# Patient Record
Sex: FEMALE | NOT HISPANIC OR LATINO
[De-identification: names, ages, dates, MRNs, and addresses within clinical notes are randomized per-mention and may not be internally consistent; named-entity substitution may affect disease eponyms.]

---

## 2017-04-06 ENCOUNTER — APPOINTMENT (OUTPATIENT)
Dept: INTERNAL MEDICINE | Facility: CLINIC | Age: 47
End: 2017-04-06

## 2017-04-06 VITALS
HEART RATE: 100 BPM | SYSTOLIC BLOOD PRESSURE: 120 MMHG | TEMPERATURE: 97.6 F | WEIGHT: 210 LBS | DIASTOLIC BLOOD PRESSURE: 83 MMHG | RESPIRATION RATE: 16 BRPM | HEIGHT: 70 IN | BODY MASS INDEX: 30.06 KG/M2

## 2017-04-06 DIAGNOSIS — L71.9 ROSACEA, UNSPECIFIED: ICD-10-CM

## 2017-04-06 LAB
ALBUMIN SERPL ELPH-MCNC: 4 G/DL
ALP BLD-CCNC: 65 U/L
ALT SERPL-CCNC: 10 U/L
ANION GAP SERPL CALC-SCNC: 13 MMOL/L
APPEARANCE: CLEAR
AST SERPL-CCNC: 11 U/L
BASOPHILS # BLD AUTO: 0.02 K/UL
BASOPHILS NFR BLD AUTO: 0.3 %
BILIRUB SERPL-MCNC: 0.6 MG/DL
BILIRUBIN URINE: NEGATIVE
BLOOD URINE: NEGATIVE
BUN SERPL-MCNC: 16 MG/DL
CALCIUM SERPL-MCNC: 8.8 MG/DL
CHLORIDE SERPL-SCNC: 105 MMOL/L
CHOLEST SERPL-MCNC: 189 MG/DL
CHOLEST/HDLC SERPL: 3.6 RATIO
CO2 SERPL-SCNC: 24 MMOL/L
COLOR: YELLOW
CREAT SERPL-MCNC: 0.64 MG/DL
EOSINOPHIL # BLD AUTO: 0.16 K/UL
EOSINOPHIL NFR BLD AUTO: 2.4 %
GLUCOSE QUALITATIVE U: NORMAL MG/DL
GLUCOSE SERPL-MCNC: 88 MG/DL
HCT VFR BLD CALC: 33.8 %
HDLC SERPL-MCNC: 52 MG/DL
HGB BLD-MCNC: 11.2 G/DL
IMM GRANULOCYTES NFR BLD AUTO: 0 %
KETONES URINE: NEGATIVE
LDLC SERPL CALC-MCNC: 116 MG/DL
LEUKOCYTE ESTERASE URINE: NEGATIVE
LYMPHOCYTES # BLD AUTO: 1.55 K/UL
LYMPHOCYTES NFR BLD AUTO: 23.1 %
MAN DIFF?: NORMAL
MCHC RBC-ENTMCNC: 29.1 PG
MCHC RBC-ENTMCNC: 33.1 GM/DL
MCV RBC AUTO: 87.8 FL
MONOCYTES # BLD AUTO: 0.35 K/UL
MONOCYTES NFR BLD AUTO: 5.2 %
NEUTROPHILS # BLD AUTO: 4.63 K/UL
NEUTROPHILS NFR BLD AUTO: 69 %
NITRITE URINE: NEGATIVE
PH URINE: 6.5
PLATELET # BLD AUTO: 407 K/UL
POTASSIUM SERPL-SCNC: 4 MMOL/L
PROT SERPL-MCNC: 7.2 G/DL
PROTEIN URINE: NEGATIVE MG/DL
RBC # BLD: 3.85 M/UL
RBC # FLD: 13.2 %
SODIUM SERPL-SCNC: 142 MMOL/L
SPECIFIC GRAVITY URINE: 1.01
TRIGL SERPL-MCNC: 103 MG/DL
UROBILINOGEN URINE: NORMAL MG/DL
WBC # FLD AUTO: 6.71 K/UL

## 2017-04-11 LAB
25(OH)D3 SERPL-MCNC: 20.7 NG/ML
HBA1C MFR BLD HPLC: 5.7 %
THYROGLOB AB SERPL-ACNC: <20 IU/ML
THYROPEROXIDASE AB SERPL IA-ACNC: <10 IU/ML
TSH SERPL-ACNC: 0.97 UIU/ML

## 2017-05-04 ENCOUNTER — APPOINTMENT (OUTPATIENT)
Dept: RHEUMATOLOGY | Facility: CLINIC | Age: 47
End: 2017-05-04

## 2017-05-04 VITALS
OXYGEN SATURATION: 98 % | HEIGHT: 70 IN | HEART RATE: 68 BPM | SYSTOLIC BLOOD PRESSURE: 110 MMHG | WEIGHT: 213 LBS | TEMPERATURE: 98 F | BODY MASS INDEX: 30.49 KG/M2 | RESPIRATION RATE: 16 BRPM | DIASTOLIC BLOOD PRESSURE: 80 MMHG

## 2017-05-04 DIAGNOSIS — D17.9 BENIGN LIPOMATOUS NEOPLASM, UNSPECIFIED: ICD-10-CM

## 2017-05-08 LAB
ANA SER IF-ACNC: NEGATIVE
CENTROMERE IGG SER-ACNC: <0.2 AL
CRP SERPL-MCNC: <0.2 MG/DL
DSDNA AB SER-ACNC: <12 IU/ML
ENA RNP AB SER IA-ACNC: <0.2 AL
ENA SCL70 IGG SER IA-ACNC: <0.2 AL
ENA SM AB SER IA-ACNC: <0.2 AL
ENA SS-A AB SER IA-ACNC: <0.2 AL
ENA SS-B AB SER IA-ACNC: <0.2 AL
ERYTHROCYTE [SEDIMENTATION RATE] IN BLOOD BY WESTERGREN METHOD: 13 MM/HR
HAV IGM SER QL: NONREACTIVE
HBV CORE IGM SER QL: NONREACTIVE
HBV SURFACE AG SER QL: NONREACTIVE
HCV AB SER QL: NONREACTIVE
HCV S/CO RATIO: 0.14 S/CO

## 2017-05-25 ENCOUNTER — APPOINTMENT (OUTPATIENT)
Dept: RHEUMATOLOGY | Facility: CLINIC | Age: 47
End: 2017-05-25

## 2017-05-25 VITALS
WEIGHT: 214 LBS | SYSTOLIC BLOOD PRESSURE: 120 MMHG | RESPIRATION RATE: 18 BRPM | TEMPERATURE: 98.2 F | HEIGHT: 68 IN | HEART RATE: 74 BPM | OXYGEN SATURATION: 98 % | DIASTOLIC BLOOD PRESSURE: 68 MMHG | BODY MASS INDEX: 32.43 KG/M2

## 2017-08-03 ENCOUNTER — MEDICATION RENEWAL (OUTPATIENT)
Age: 47
End: 2017-08-03

## 2017-09-21 ENCOUNTER — APPOINTMENT (OUTPATIENT)
Dept: RHEUMATOLOGY | Facility: CLINIC | Age: 47
End: 2017-09-21
Payer: COMMERCIAL

## 2017-09-21 VITALS
TEMPERATURE: 98.4 F | RESPIRATION RATE: 98 BRPM | HEART RATE: 78 BPM | OXYGEN SATURATION: 98 % | HEIGHT: 68 IN | WEIGHT: 210 LBS | BODY MASS INDEX: 31.83 KG/M2 | DIASTOLIC BLOOD PRESSURE: 76 MMHG | SYSTOLIC BLOOD PRESSURE: 112 MMHG

## 2017-09-21 PROCEDURE — 99214 OFFICE O/P EST MOD 30 MIN: CPT

## 2017-10-05 ENCOUNTER — APPOINTMENT (OUTPATIENT)
Dept: RHEUMATOLOGY | Facility: CLINIC | Age: 47
End: 2017-10-05
Payer: COMMERCIAL

## 2017-10-05 VITALS — DIASTOLIC BLOOD PRESSURE: 56 MMHG | BODY MASS INDEX: 32.23 KG/M2 | SYSTOLIC BLOOD PRESSURE: 98 MMHG | WEIGHT: 212 LBS

## 2017-10-05 PROCEDURE — 20610 DRAIN/INJ JOINT/BURSA W/O US: CPT | Mod: LT

## 2017-10-05 RX ORDER — HYLAN G-F 20 16MG/2ML
16 SYRINGE (ML) INTRAARTICULAR
Qty: 0 | Refills: 0 | Status: COMPLETED | OUTPATIENT
Start: 2017-10-05

## 2017-10-05 RX ADMIN — Medication 0 MG/2ML: at 00:00

## 2017-10-12 ENCOUNTER — APPOINTMENT (OUTPATIENT)
Dept: RHEUMATOLOGY | Facility: CLINIC | Age: 47
End: 2017-10-12
Payer: COMMERCIAL

## 2017-10-12 PROCEDURE — 20610 DRAIN/INJ JOINT/BURSA W/O US: CPT | Mod: LT

## 2017-10-12 RX ORDER — HYLAN G-F 20 16MG/2ML
16 SYRINGE (ML) INTRAARTICULAR
Qty: 0 | Refills: 0 | Status: COMPLETED | OUTPATIENT
Start: 2017-10-12

## 2017-10-12 RX ADMIN — Medication 0 MG/2ML: at 00:00

## 2017-10-17 VITALS
HEART RATE: 78 BPM | BODY MASS INDEX: 32.28 KG/M2 | SYSTOLIC BLOOD PRESSURE: 130 MMHG | HEIGHT: 68 IN | RESPIRATION RATE: 16 BRPM | OXYGEN SATURATION: 98 % | WEIGHT: 213 LBS | DIASTOLIC BLOOD PRESSURE: 70 MMHG | TEMPERATURE: 98.9 F

## 2017-10-19 ENCOUNTER — APPOINTMENT (OUTPATIENT)
Dept: RHEUMATOLOGY | Facility: CLINIC | Age: 47
End: 2017-10-19
Payer: COMMERCIAL

## 2017-10-19 VITALS — BODY MASS INDEX: 32.39 KG/M2 | WEIGHT: 213 LBS | DIASTOLIC BLOOD PRESSURE: 72 MMHG | SYSTOLIC BLOOD PRESSURE: 116 MMHG

## 2017-10-19 VITALS — OXYGEN SATURATION: 98 % | TEMPERATURE: 98.7 F | RESPIRATION RATE: 16 BRPM | HEART RATE: 73 BPM

## 2017-10-19 PROCEDURE — 20610 DRAIN/INJ JOINT/BURSA W/O US: CPT | Mod: LT

## 2017-10-19 RX ORDER — HYLAN G-F 20 16MG/2ML
16 SYRINGE (ML) INTRAARTICULAR
Qty: 0 | Refills: 0 | Status: COMPLETED | OUTPATIENT
Start: 2017-10-19

## 2017-10-19 RX ADMIN — Medication 0 MG/2ML: at 00:00

## 2017-10-24 ENCOUNTER — MOBILE ON CALL (OUTPATIENT)
Age: 47
End: 2017-10-24

## 2017-10-31 ENCOUNTER — APPOINTMENT (OUTPATIENT)
Dept: RHEUMATOLOGY | Facility: CLINIC | Age: 47
End: 2017-10-31

## 2017-11-14 ENCOUNTER — APPOINTMENT (OUTPATIENT)
Dept: RHEUMATOLOGY | Facility: CLINIC | Age: 47
End: 2017-11-14
Payer: COMMERCIAL

## 2017-11-14 VITALS
SYSTOLIC BLOOD PRESSURE: 120 MMHG | OXYGEN SATURATION: 98 % | RESPIRATION RATE: 16 BRPM | BODY MASS INDEX: 32.84 KG/M2 | TEMPERATURE: 98.3 F | WEIGHT: 216 LBS | HEART RATE: 86 BPM | DIASTOLIC BLOOD PRESSURE: 68 MMHG

## 2017-11-14 PROCEDURE — 20610 DRAIN/INJ JOINT/BURSA W/O US: CPT | Mod: LT

## 2017-11-14 PROCEDURE — 99213 OFFICE O/P EST LOW 20 MIN: CPT | Mod: 25

## 2017-11-14 RX ORDER — HYLAN G-F 20 16MG/2ML
16 SYRINGE (ML) INTRAARTICULAR
Qty: 0 | Refills: 0 | Status: COMPLETED | OUTPATIENT
Start: 2017-11-14

## 2017-11-14 RX ADMIN — Medication 0 MG/2ML: at 00:00

## 2017-11-21 ENCOUNTER — APPOINTMENT (OUTPATIENT)
Dept: RHEUMATOLOGY | Facility: CLINIC | Age: 47
End: 2017-11-21
Payer: COMMERCIAL

## 2017-11-21 VITALS — DIASTOLIC BLOOD PRESSURE: 68 MMHG | SYSTOLIC BLOOD PRESSURE: 106 MMHG

## 2017-11-21 PROCEDURE — 20610 DRAIN/INJ JOINT/BURSA W/O US: CPT

## 2017-11-21 RX ADMIN — Medication 0 MG/2ML: at 00:00

## 2017-11-30 ENCOUNTER — APPOINTMENT (OUTPATIENT)
Dept: RHEUMATOLOGY | Facility: CLINIC | Age: 47
End: 2017-11-30
Payer: COMMERCIAL

## 2017-11-30 VITALS
RESPIRATION RATE: 16 BRPM | TEMPERATURE: 98.4 F | WEIGHT: 214 LBS | SYSTOLIC BLOOD PRESSURE: 102 MMHG | HEART RATE: 89 BPM | DIASTOLIC BLOOD PRESSURE: 66 MMHG | HEIGHT: 68 IN | OXYGEN SATURATION: 98 % | BODY MASS INDEX: 32.43 KG/M2

## 2017-11-30 PROCEDURE — 20610 DRAIN/INJ JOINT/BURSA W/O US: CPT | Mod: RT

## 2017-11-30 RX ORDER — HYLAN G-F 20 16MG/2ML
16 SYRINGE (ML) INTRAARTICULAR
Qty: 0 | Refills: 0 | Status: COMPLETED | OUTPATIENT
Start: 2017-11-21

## 2017-11-30 RX ORDER — HYLAN G-F 20 16MG/2ML
16 SYRINGE (ML) INTRAARTICULAR
Qty: 0 | Refills: 0 | Status: COMPLETED | OUTPATIENT
Start: 2017-11-30

## 2017-11-30 RX ADMIN — Medication 0 MG/2ML: at 00:00

## 2018-01-25 ENCOUNTER — APPOINTMENT (OUTPATIENT)
Dept: RHEUMATOLOGY | Facility: CLINIC | Age: 48
End: 2018-01-25
Payer: COMMERCIAL

## 2018-01-25 VITALS
SYSTOLIC BLOOD PRESSURE: 116 MMHG | OXYGEN SATURATION: 98 % | RESPIRATION RATE: 16 BRPM | BODY MASS INDEX: 32.74 KG/M2 | TEMPERATURE: 98 F | HEIGHT: 68 IN | WEIGHT: 216 LBS | HEART RATE: 81 BPM | DIASTOLIC BLOOD PRESSURE: 70 MMHG

## 2018-01-25 DIAGNOSIS — M25.549 PAIN IN JOINTS OF UNSPECIFIED HAND: ICD-10-CM

## 2018-01-25 PROCEDURE — 99214 OFFICE O/P EST MOD 30 MIN: CPT

## 2018-01-28 PROBLEM — M25.549 HAND JOINT PAIN: Status: ACTIVE | Noted: 2017-05-04

## 2018-02-21 ENCOUNTER — RX RENEWAL (OUTPATIENT)
Age: 48
End: 2018-02-21

## 2018-03-26 ENCOUNTER — MOBILE ON CALL (OUTPATIENT)
Age: 48
End: 2018-03-26

## 2018-04-17 ENCOUNTER — APPOINTMENT (OUTPATIENT)
Dept: INTERNAL MEDICINE | Facility: CLINIC | Age: 48
End: 2018-04-17
Payer: COMMERCIAL

## 2018-04-17 VITALS
DIASTOLIC BLOOD PRESSURE: 80 MMHG | HEIGHT: 68 IN | RESPIRATION RATE: 16 BRPM | SYSTOLIC BLOOD PRESSURE: 124 MMHG | HEART RATE: 79 BPM | TEMPERATURE: 98 F | WEIGHT: 216 LBS | BODY MASS INDEX: 32.74 KG/M2 | OXYGEN SATURATION: 98 %

## 2018-04-17 PROCEDURE — 99213 OFFICE O/P EST LOW 20 MIN: CPT

## 2018-04-25 ENCOUNTER — RX RENEWAL (OUTPATIENT)
Age: 48
End: 2018-04-25

## 2018-05-03 ENCOUNTER — APPOINTMENT (OUTPATIENT)
Dept: RHEUMATOLOGY | Facility: CLINIC | Age: 48
End: 2018-05-03
Payer: COMMERCIAL

## 2018-05-03 ENCOUNTER — LABORATORY RESULT (OUTPATIENT)
Age: 48
End: 2018-05-03

## 2018-05-03 VITALS
HEART RATE: 79 BPM | BODY MASS INDEX: 32.74 KG/M2 | WEIGHT: 216 LBS | DIASTOLIC BLOOD PRESSURE: 80 MMHG | HEIGHT: 68 IN | TEMPERATURE: 98.4 F | SYSTOLIC BLOOD PRESSURE: 129 MMHG | RESPIRATION RATE: 16 BRPM | OXYGEN SATURATION: 96 %

## 2018-05-03 PROCEDURE — 99213 OFFICE O/P EST LOW 20 MIN: CPT

## 2018-05-07 LAB
25(OH)D3 SERPL-MCNC: 26.5 NG/ML
ALBUMIN SERPL ELPH-MCNC: 4.1 G/DL
ALP BLD-CCNC: 70 U/L
ALT SERPL-CCNC: 11 U/L
ANION GAP SERPL CALC-SCNC: 16 MMOL/L
APPEARANCE: CLEAR
AST SERPL-CCNC: 16 U/L
BASOPHILS # BLD AUTO: 0.02 K/UL
BASOPHILS NFR BLD AUTO: 0.3 %
BILIRUB SERPL-MCNC: 0.7 MG/DL
BILIRUBIN URINE: NEGATIVE
BLOOD URINE: NEGATIVE
BUN SERPL-MCNC: 15 MG/DL
CALCIUM SERPL-MCNC: 9.8 MG/DL
CHLORIDE SERPL-SCNC: 103 MMOL/L
CHOLEST SERPL-MCNC: 196 MG/DL
CHOLEST/HDLC SERPL: 3.4 RATIO
CO2 SERPL-SCNC: 20 MMOL/L
COLOR: ABNORMAL
CREAT SERPL-MCNC: 0.72 MG/DL
EOSINOPHIL # BLD AUTO: 0.09 K/UL
EOSINOPHIL NFR BLD AUTO: 1.2 %
GLUCOSE QUALITATIVE U: NEGATIVE MG/DL
GLUCOSE SERPL-MCNC: 59 MG/DL
HBA1C MFR BLD HPLC: 5.6 %
HCT VFR BLD CALC: 37.9 %
HDLC SERPL-MCNC: 57 MG/DL
HGB BLD-MCNC: 11.4 G/DL
IMM GRANULOCYTES NFR BLD AUTO: 0.3 %
KETONES URINE: ABNORMAL
LDLC SERPL CALC-MCNC: 111 MG/DL
LEUKOCYTE ESTERASE URINE: NEGATIVE
LYMPHOCYTES # BLD AUTO: 1.79 K/UL
LYMPHOCYTES NFR BLD AUTO: 24.7 %
MAN DIFF?: NORMAL
MCHC RBC-ENTMCNC: 27.8 PG
MCHC RBC-ENTMCNC: 30.1 GM/DL
MCV RBC AUTO: 92.4 FL
MONOCYTES # BLD AUTO: 0.46 K/UL
MONOCYTES NFR BLD AUTO: 6.4 %
NEUTROPHILS # BLD AUTO: 4.86 K/UL
NEUTROPHILS NFR BLD AUTO: 67.1 %
NITRITE URINE: NEGATIVE
PH URINE: 5.5
PLATELET # BLD AUTO: 429 K/UL
POTASSIUM SERPL-SCNC: 4.1 MMOL/L
PROT SERPL-MCNC: 7.5 G/DL
PROTEIN URINE: ABNORMAL MG/DL
RBC # BLD: 4.1 M/UL
RBC # FLD: 15.7 %
SODIUM SERPL-SCNC: 139 MMOL/L
SPECIFIC GRAVITY URINE: 1.03
TRIGL SERPL-MCNC: 141 MG/DL
TSH SERPL-ACNC: 1.29 UIU/ML
UROBILINOGEN URINE: NEGATIVE MG/DL
WBC # FLD AUTO: 7.24 K/UL

## 2018-05-17 ENCOUNTER — FORM ENCOUNTER (OUTPATIENT)
Age: 48
End: 2018-05-17

## 2018-05-18 ENCOUNTER — OUTPATIENT (OUTPATIENT)
Dept: OUTPATIENT SERVICES | Facility: HOSPITAL | Age: 48
LOS: 1 days | End: 2018-05-18
Payer: COMMERCIAL

## 2018-05-18 ENCOUNTER — APPOINTMENT (OUTPATIENT)
Dept: ULTRASOUND IMAGING | Facility: HOSPITAL | Age: 48
End: 2018-05-18
Payer: COMMERCIAL

## 2018-05-18 DIAGNOSIS — E04.1 NONTOXIC SINGLE THYROID NODULE: ICD-10-CM

## 2018-05-18 PROCEDURE — 76536 US EXAM OF HEAD AND NECK: CPT | Mod: 26

## 2018-05-18 PROCEDURE — 76536 US EXAM OF HEAD AND NECK: CPT

## 2018-05-30 ENCOUNTER — RX RENEWAL (OUTPATIENT)
Age: 48
End: 2018-05-30

## 2018-08-15 ENCOUNTER — APPOINTMENT (OUTPATIENT)
Dept: INTERNAL MEDICINE | Facility: CLINIC | Age: 48
End: 2018-08-15
Payer: COMMERCIAL

## 2018-08-15 VITALS
HEIGHT: 68 IN | HEART RATE: 82 BPM | SYSTOLIC BLOOD PRESSURE: 111 MMHG | OXYGEN SATURATION: 98 % | BODY MASS INDEX: 32.74 KG/M2 | DIASTOLIC BLOOD PRESSURE: 76 MMHG | WEIGHT: 216 LBS | TEMPERATURE: 98.6 F | RESPIRATION RATE: 16 BRPM

## 2018-08-15 PROCEDURE — 99213 OFFICE O/P EST LOW 20 MIN: CPT

## 2018-08-15 NOTE — ASSESSMENT
[FreeTextEntry1] : 48 yo F, euthyroid(biochemically and clinically)  with right thyroid cyst, referred to head /neck surgery for complex cyst removal.

## 2018-08-15 NOTE — HISTORY OF PRESENT ILLNESS
[FreeTextEntry1] : follow up appointment  [de-identified] : 48 yo F, euthyroid with known right thyroid lobe complex cyst, gradual increase in size, no obstructive symptoms, no dysphagia. FNA was negative for malignancy. Patient wanted to remove it by aspiration for cosmetic reasons. Patient is euthyroid biochemically and clinically,. Thyroid antibodies are negative. \par \par She also has OA of knees, no evidence of autoimmune pathology, managed with NSAIDS, gabapentin , physical therapy on PRN basis.

## 2018-08-15 NOTE — PHYSICAL EXAM
[No Acute Distress] : no acute distress [Well Nourished] : well nourished [Well Developed] : well developed [Normal Sclera/Conjunctiva] : normal sclera/conjunctiva [PERRL] : pupils equal round and reactive to light [No JVD] : no jugular venous distention [No Respiratory Distress] : no respiratory distress  [Clear to Auscultation] : lungs were clear to auscultation bilaterally [Normal Rate] : normal rate  [Regular Rhythm] : with a regular rhythm [Normal S1, S2] : normal S1 and S2 [Soft] : abdomen soft [Non Tender] : non-tender [Non-distended] : non-distended [No HSM] : no HSM [Normal Gait] : normal gait [No Focal Deficits] : no focal deficits [de-identified] : Right thyroid nodule, soft, non-tender mobile nodule.

## 2018-08-16 LAB — HEMOCCULT STL QL IA: NEGATIVE

## 2018-09-20 ENCOUNTER — APPOINTMENT (OUTPATIENT)
Dept: RHEUMATOLOGY | Facility: CLINIC | Age: 48
End: 2018-09-20
Payer: COMMERCIAL

## 2018-09-20 VITALS
RESPIRATION RATE: 16 BRPM | OXYGEN SATURATION: 98 % | WEIGHT: 214 LBS | SYSTOLIC BLOOD PRESSURE: 118 MMHG | HEIGHT: 68 IN | TEMPERATURE: 98.6 F | HEART RATE: 78 BPM | DIASTOLIC BLOOD PRESSURE: 68 MMHG | BODY MASS INDEX: 32.43 KG/M2

## 2018-09-20 PROCEDURE — 20610 DRAIN/INJ JOINT/BURSA W/O US: CPT | Mod: 50

## 2018-09-20 PROCEDURE — 99213 OFFICE O/P EST LOW 20 MIN: CPT | Mod: 25

## 2018-09-20 RX ORDER — HYALURONATE SODIUM 20 MG/2 ML
20 SYRINGE (ML) INTRAARTICULAR
Qty: 0 | Refills: 0 | Status: COMPLETED | OUTPATIENT
Start: 2018-09-20

## 2018-09-20 RX ADMIN — Medication 0 MG/2ML: at 00:00

## 2018-09-27 ENCOUNTER — APPOINTMENT (OUTPATIENT)
Dept: RHEUMATOLOGY | Facility: CLINIC | Age: 48
End: 2018-09-27
Payer: COMMERCIAL

## 2018-09-27 VITALS
DIASTOLIC BLOOD PRESSURE: 76 MMHG | TEMPERATURE: 98.2 F | OXYGEN SATURATION: 99 % | HEIGHT: 68 IN | SYSTOLIC BLOOD PRESSURE: 118 MMHG | BODY MASS INDEX: 32.43 KG/M2 | HEART RATE: 67 BPM | RESPIRATION RATE: 16 BRPM | WEIGHT: 214 LBS

## 2018-09-27 PROCEDURE — 20610 DRAIN/INJ JOINT/BURSA W/O US: CPT | Mod: 50

## 2018-09-27 RX ORDER — HYALURONATE SODIUM 20 MG/2 ML
20 SYRINGE (ML) INTRAARTICULAR
Qty: 0 | Refills: 0 | Status: COMPLETED | OUTPATIENT
Start: 2018-09-27

## 2018-09-27 RX ADMIN — Medication 0 MG/2ML: at 00:00

## 2018-10-04 ENCOUNTER — APPOINTMENT (OUTPATIENT)
Dept: RHEUMATOLOGY | Facility: CLINIC | Age: 48
End: 2018-10-04
Payer: COMMERCIAL

## 2018-10-04 VITALS
TEMPERATURE: 98.2 F | HEART RATE: 62 BPM | HEIGHT: 68 IN | DIASTOLIC BLOOD PRESSURE: 78 MMHG | WEIGHT: 214 LBS | RESPIRATION RATE: 16 BRPM | SYSTOLIC BLOOD PRESSURE: 115 MMHG | OXYGEN SATURATION: 97 % | BODY MASS INDEX: 32.43 KG/M2

## 2018-10-04 PROCEDURE — 20610 DRAIN/INJ JOINT/BURSA W/O US: CPT | Mod: 50

## 2018-10-04 RX ORDER — HYALURONATE SODIUM 20 MG/2 ML
20 SYRINGE (ML) INTRAARTICULAR
Qty: 0 | Refills: 0 | Status: COMPLETED | OUTPATIENT
Start: 2018-10-04

## 2018-10-04 RX ADMIN — Medication 0 MG/2ML: at 00:00

## 2018-10-24 ENCOUNTER — RX RENEWAL (OUTPATIENT)
Age: 48
End: 2018-10-24

## 2018-11-07 ENCOUNTER — APPOINTMENT (OUTPATIENT)
Dept: SURGERY | Facility: CLINIC | Age: 48
End: 2018-11-07

## 2018-11-27 ENCOUNTER — RX RENEWAL (OUTPATIENT)
Age: 48
End: 2018-11-27

## 2018-12-11 ENCOUNTER — APPOINTMENT (OUTPATIENT)
Dept: RHEUMATOLOGY | Facility: CLINIC | Age: 48
End: 2018-12-11

## 2019-01-02 ENCOUNTER — RX CHANGE (OUTPATIENT)
Age: 49
End: 2019-01-02

## 2019-02-01 ENCOUNTER — MOBILE ON CALL (OUTPATIENT)
Age: 49
End: 2019-02-01

## 2019-02-15 ENCOUNTER — OTHER (OUTPATIENT)
Age: 49
End: 2019-02-15

## 2019-03-11 ENCOUNTER — MOBILE ON CALL (OUTPATIENT)
Age: 49
End: 2019-03-11

## 2019-04-08 ENCOUNTER — RX RENEWAL (OUTPATIENT)
Age: 49
End: 2019-04-08

## 2019-04-18 ENCOUNTER — MEDICATION RENEWAL (OUTPATIENT)
Age: 49
End: 2019-04-18

## 2019-05-29 ENCOUNTER — APPOINTMENT (OUTPATIENT)
Dept: RHEUMATOLOGY | Facility: CLINIC | Age: 49
End: 2019-05-29
Payer: COMMERCIAL

## 2019-05-29 ENCOUNTER — APPOINTMENT (OUTPATIENT)
Dept: INTERNAL MEDICINE | Facility: CLINIC | Age: 49
End: 2019-05-29
Payer: COMMERCIAL

## 2019-05-29 VITALS
DIASTOLIC BLOOD PRESSURE: 74 MMHG | BODY MASS INDEX: 33.04 KG/M2 | SYSTOLIC BLOOD PRESSURE: 119 MMHG | RESPIRATION RATE: 16 BRPM | WEIGHT: 218 LBS | OXYGEN SATURATION: 97 % | HEIGHT: 68 IN | TEMPERATURE: 98.6 F | HEART RATE: 72 BPM

## 2019-05-29 DIAGNOSIS — Z80.0 FAMILY HISTORY OF MALIGNANT NEOPLASM OF DIGESTIVE ORGANS: ICD-10-CM

## 2019-05-29 DIAGNOSIS — Z12.11 ENCOUNTER FOR SCREENING FOR MALIGNANT NEOPLASM OF COLON: ICD-10-CM

## 2019-05-29 PROCEDURE — 99386 PREV VISIT NEW AGE 40-64: CPT | Mod: 25

## 2019-05-29 PROCEDURE — G0447 BEHAVIOR COUNSEL OBESITY 15M: CPT

## 2019-05-29 PROCEDURE — 99213 OFFICE O/P EST LOW 20 MIN: CPT

## 2019-05-29 PROCEDURE — 99214 OFFICE O/P EST MOD 30 MIN: CPT

## 2019-05-29 PROCEDURE — 99396 PREV VISIT EST AGE 40-64: CPT

## 2019-05-29 NOTE — PHYSICAL EXAM
[No Acute Distress] : no acute distress [Well Nourished] : well nourished [Well Developed] : well developed [Normal Sclera/Conjunctiva] : normal sclera/conjunctiva [PERRL] : pupils equal round and reactive to light [No JVD] : no jugular venous distention [No Respiratory Distress] : no respiratory distress  [Clear to Auscultation] : lungs were clear to auscultation bilaterally [Normal Rate] : normal rate  [Regular Rhythm] : with a regular rhythm [Normal S1, S2] : normal S1 and S2 [Soft] : abdomen soft [Non Tender] : non-tender [Non-distended] : non-distended [No HSM] : no HSM [Normal Gait] : normal gait [No Focal Deficits] : no focal deficits [de-identified] : Right thyroid nodule, soft, non-tender round mobile nodule.

## 2019-05-29 NOTE — REVIEW OF SYSTEMS
[Joint Swelling] : joint swelling [Arthralgias] : arthralgias [Joint Stiffness] : joint stiffness [Difficulty Walking] : difficulty walking [Muscle Weakness] : muscle weakness [Fever] : no fever [Chills] : no chills [Eye Pain] : no eye pain [Hoarseness] : no hoarseness [Sore Throat] : no sore throat [Chest Pain] : no chest pain [Palpitations] : no palpitations [Shortness Of Breath] : no shortness of breath [Cough] : no cough [Depression] : no depression [Skin Lesions] : no skin lesions [Heartburn] : no heartburn

## 2019-05-29 NOTE — HEALTH RISK ASSESSMENT
[Good] : ~his/her~  mood as  good [No falls in past year] : Patient reported no falls in the past year [0] : 2) Feeling down, depressed, or hopeless: Not at all (0) [Patient declined mammogram] : Patient declined mammogram [HIV test declined] : HIV test declined [Hepatitis C test declined] : Hepatitis C test declined [Employed] : employed [Fully functional (bathing, dressing, toileting, transferring, walking, feeding)] : Fully functional (bathing, dressing, toileting, transferring, walking, feeding) [Fully functional (using the telephone, shopping, preparing meals, housekeeping, doing laundry, using] : Fully functional and needs no help or supervision to perform IADLs (using the telephone, shopping, preparing meals, housekeeping, doing laundry, using transportation, managing medications and managing finances) [Designated Healthcare Proxy] : Designated healthcare proxy [Name: ___] : Health Care Proxy's Name: [unfilled]  [Relationship: ___] : Relationship: [unfilled] [[id number]] : 0 [] : No [ColonoscopyComments] : na [BoneDensityComments] : na

## 2019-05-29 NOTE — COUNSELING
[Weight management counseling provided] : Weight management [Healthy eating counseling provided] : healthy eating [Activity counseling provided] : activity [Weight Self Once Weekly] : Weight self once weekly [Decrease Portions] : Decrease food portions [Walking] : Walking [None] : None [Good understanding] : Patient has a good understanding of lifestyle changes and the steps needed to achieve self management goals [ - Behavioral Counseling for Obesity (Face-to-Face for 15 Minutes)] : Behavioral Counseling for Obesity (Face-to-Face for 15 Minutes)

## 2019-05-29 NOTE — DATA REVIEWED
[FreeTextEntry1] : 4/2018:\par KNEE plain films:\par \par Joint space narrowing of the medial compartments, R>L with osteophytic formation over the medial and  lateral joint lines.  Mild left knee fluid collection in the suprapatellar recess.  No fractures identified.\par \par \par

## 2019-05-29 NOTE — PROCEDURE
[Patient] : the patient [Other Date:___] : Date: [unfilled] [Benefits] : benefits [Risks] : risks [Therapeutic] : therapeutic [Consent Obtained] : written consent was obtained prior to the procedure and is detailed in the patient's record [#1 Site: ______] : #1 site identified in the [unfilled] [#2 Site: ___] : # 2 site identified in the [unfilled] [Ethyl Chloride] : ethyl chloride [Betadine] : with betadine solution [25 gauge 1.5 inch] : A 25 gauge 1.5 inch needle was used [___ml Visccosupplementation] : [unfilled] ml of viscosupplementation [Tolerated Well] : the patient tolerated the procedure well [No Complications] : there were no complications [Patient Instructed to Call] : patient was instructed to call if redness at site, a decrease in range of motion or an increase in pain is noted after procedure.

## 2019-05-29 NOTE — REVIEW OF SYSTEMS
[Arthralgias] : arthralgias [Joint Swelling] : joint swelling [Joint Stiffness] : joint stiffness [Difficulty Walking] : difficulty walking [Muscle Weakness] : muscle weakness [Fever] : no fever [Eye Pain] : no eye pain [Chills] : no chills [Sore Throat] : no sore throat [Hoarseness] : no hoarseness [Chest Pain] : no chest pain [Shortness Of Breath] : no shortness of breath [Palpitations] : no palpitations [Cough] : no cough [Depression] : no depression [Heartburn] : no heartburn [Skin Lesions] : no skin lesions

## 2019-05-29 NOTE — PHYSICAL EXAM
[General Appearance - Alert] : alert [General Appearance - In No Acute Distress] : in no acute distress [Sclera] : the sclera and conjunctiva were normal [Examination Of The Oral Cavity] : the lips and gums were normal [Oropharynx] : the oropharynx was normal [Neck Appearance] : the appearance of the neck was normal [] : no respiratory distress [Edema] : there was no peripheral edema [Heart Rate And Rhythm] : heart rate was normal and rhythm regular [No Spinal Tenderness] : no spinal tenderness [Musculoskeletal - Swelling] : no joint swelling seen [Nail Clubbing] : no clubbing  or cyanosis of the fingernails [Motor Tone] : muscle strength and tone were normal [Motor Exam] : the motor exam was normal [Oriented To Time, Place, And Person] : oriented to person, place, and time [Impaired Insight] : insight and judgment were intact [FreeTextEntry1] : tearful

## 2019-05-29 NOTE — ASSESSMENT
[FreeTextEntry1] : Patient with knee DJD and LBP, resurfacing of pain with UE stiffness:\par   \par Patient to continue with gabapentin and advised to stay 100 mg dosing for the evening so as not to feel that a sedatory effects with  antihistamine use which lends to morning lethargy. Patient to have inflammatory markers and serologies drawn to assess for an underlying inflammatory process lending to presentation in the setting of fatigue and joint pain.\par Patient will benefit from continued physical therapy to help with joint mobility and muscle strengthening of the knees and lower back.  Weight loss has been encouraged to reduce load over the medial joint line.  Viscosupplementation has been encouraged to provide additional lubrication and joint support.  In the interim, Diclofenac gel has been prescribed.  PT referral given. Compression stockings recommended.\par The importance of dietary and lifestyle modifications was reiterated along with discussions on relaxation, stress-reducing techniques and importance of good sleep hygiene. \par \par \par \par  She is in agreement with the above plan and will return in one weeks' time.\par \par

## 2019-05-29 NOTE — PROCEDURE
[Patient] : the patient [Other Date:___] : Date: [unfilled] [Risks] : risks [Benefits] : benefits [Therapeutic] : therapeutic [Consent Obtained] : written consent was obtained prior to the procedure and is detailed in the patient's record [#1 Site: ______] : #1 site identified in the [unfilled] [#2 Site: ___] : # 2 site identified in the [unfilled] [Ethyl Chloride] : ethyl chloride [Betadine] : with betadine solution [25 gauge 1.5 inch] : A 25 gauge 1.5 inch needle was used [___ml Visccosupplementation] : [unfilled] ml of viscosupplementation [Tolerated Well] : the patient tolerated the procedure well [No Complications] : there were no complications [Patient Instructed to Call] : patient was instructed to call if redness at site, a decrease in range of motion or an increase in pain is noted after procedure.

## 2019-05-29 NOTE — HISTORY OF PRESENT ILLNESS
[FreeTextEntry1] : Patient reporting 30mins of morning stiffness over the last three weeks.  She explains having started antihistamine along with Gabapentin in the evenings.  She notes heaviness around hips and lower back. Patient reports discomfort over the lower right side of back and hip, exacerbated by prolonged sitting. She also explains tightness in the hands.  She finds the fluctuating weather can exacerbate symptoms\par Patient finds lubrication from last year to be helpful in regards to stiffness and radiation of pain, ie: she noted increased mobility; ambulating 10-15 blocks daily. She now notes resurfacing symptoms of stiffness and subsequent pain with minimal exercise modification. .  \par She otherwise denies accompanied joint swelling, paresthesias or motor disturbances.\par

## 2019-05-29 NOTE — HISTORY OF PRESENT ILLNESS
[FreeTextEntry1] : routine exam.\par HA's/sinus/nasal congestion/allergies [de-identified] : 47 yo c/o seasonal nasal congestion,sinus HA's,itchy eyes,ears,skin.\par h/o OA's,mostly knees(benefits from gel inj),but also back pain,hips.

## 2019-05-29 NOTE — PHYSICAL EXAM
[General Appearance - Alert] : alert [General Appearance - In No Acute Distress] : in no acute distress [Sclera] : the sclera and conjunctiva were normal [Examination Of The Oral Cavity] : the lips and gums were normal [Neck Appearance] : the appearance of the neck was normal [Oropharynx] : the oropharynx was normal [] : no respiratory distress [Heart Rate And Rhythm] : heart rate was normal and rhythm regular [Edema] : there was no peripheral edema [No Spinal Tenderness] : no spinal tenderness [Nail Clubbing] : no clubbing  or cyanosis of the fingernails [Musculoskeletal - Swelling] : no joint swelling seen [Motor Tone] : muscle strength and tone were normal [Motor Exam] : the motor exam was normal [Oriented To Time, Place, And Person] : oriented to person, place, and time [Impaired Insight] : insight and judgment were intact [FreeTextEntry1] : Spider veins over the popliteal fossa and LE

## 2019-05-29 NOTE — PAST MEDICAL HISTORY
[Menarche Age ____] : age at menarche was [unfilled] [Approximately ___] : the LMP was approximately [unfilled] [Regular Cycle Intervals] : have been regular [Total Preg ___] : G[unfilled]

## 2019-05-30 LAB
APPEARANCE: CLEAR
BILIRUBIN URINE: NEGATIVE
BLOOD URINE: NEGATIVE
COLOR: YELLOW
CRP SERPL-MCNC: 0.12 MG/DL
ERYTHROCYTE [SEDIMENTATION RATE] IN BLOOD BY WESTERGREN METHOD: 7 MM/HR
GLUCOSE QUALITATIVE U: NEGATIVE
KETONES URINE: NEGATIVE
LEUKOCYTE ESTERASE URINE: NEGATIVE
NITRITE URINE: NEGATIVE
PH URINE: 5.5
PROTEIN URINE: NORMAL
RHEUMATOID FACT SER QL: <10 IU/ML
SPECIFIC GRAVITY URINE: 1.03
UROBILINOGEN URINE: NORMAL

## 2019-05-31 LAB
ANA SER IF-ACNC: NEGATIVE
CCP AB SER IA-ACNC: <8 UNITS
DSDNA AB SER-ACNC: <12 IU/ML
ENA RNP AB SER IA-ACNC: <0.2 AL
ENA SM AB SER IA-ACNC: <0.2 AL
ENA SS-A AB SER IA-ACNC: <0.2 AL
ENA SS-B AB SER IA-ACNC: <0.2 AL
RF+CCP IGG SER-IMP: NEGATIVE

## 2019-06-03 LAB
25(OH)D3 SERPL-MCNC: 15 NG/ML
ALBUMIN SERPL ELPH-MCNC: 4.2 G/DL
ALP BLD-CCNC: 76 U/L
ALT SERPL-CCNC: 23 U/L
ANION GAP SERPL CALC-SCNC: 12 MMOL/L
APPEARANCE: CLEAR
AST SERPL-CCNC: 19 U/L
BASOPHILS # BLD AUTO: 0.05 K/UL
BASOPHILS NFR BLD AUTO: 0.7 %
BILIRUB SERPL-MCNC: 0.4 MG/DL
BILIRUBIN URINE: NEGATIVE
BLOOD URINE: NEGATIVE
BUN SERPL-MCNC: 14 MG/DL
CALCIUM SERPL-MCNC: 9.1 MG/DL
CHLORIDE SERPL-SCNC: 103 MMOL/L
CHOLEST SERPL-MCNC: 200 MG/DL
CHOLEST/HDLC SERPL: 3.4 RATIO
CO2 SERPL-SCNC: 23 MMOL/L
COLOR: YELLOW
CREAT SERPL-MCNC: 0.63 MG/DL
EOSINOPHIL # BLD AUTO: 0.17 K/UL
EOSINOPHIL NFR BLD AUTO: 2.5 %
ESTIMATED AVERAGE GLUCOSE: 105 MG/DL
GLUCOSE QUALITATIVE U: NEGATIVE
GLUCOSE SERPL-MCNC: 91 MG/DL
HBA1C MFR BLD HPLC: 5.3 %
HCT VFR BLD CALC: 35.6 %
HDLC SERPL-MCNC: 59 MG/DL
HGB BLD-MCNC: 11.2 G/DL
IMM GRANULOCYTES NFR BLD AUTO: 0.1 %
KETONES URINE: NEGATIVE
LDLC SERPL CALC-MCNC: 108 MG/DL
LEUKOCYTE ESTERASE URINE: NEGATIVE
LYMPHOCYTES # BLD AUTO: 2.06 K/UL
LYMPHOCYTES NFR BLD AUTO: 30 %
MAN DIFF?: NORMAL
MCHC RBC-ENTMCNC: 28.2 PG
MCHC RBC-ENTMCNC: 31.5 GM/DL
MCV RBC AUTO: 89.7 FL
MONOCYTES # BLD AUTO: 0.48 K/UL
MONOCYTES NFR BLD AUTO: 7 %
NEUTROPHILS # BLD AUTO: 4.1 K/UL
NEUTROPHILS NFR BLD AUTO: 59.7 %
NITRITE URINE: NEGATIVE
PH URINE: 5.5
PLATELET # BLD AUTO: 390 K/UL
POTASSIUM SERPL-SCNC: 4 MMOL/L
PROT SERPL-MCNC: 6.9 G/DL
PROTEIN URINE: NORMAL
RBC # BLD: 3.97 M/UL
RBC # FLD: 14.9 %
SODIUM SERPL-SCNC: 138 MMOL/L
SPECIFIC GRAVITY URINE: 1.03
TRIGL SERPL-MCNC: 165 MG/DL
TSH SERPL-ACNC: 1.23 UIU/ML
UROBILINOGEN URINE: NORMAL
WBC # FLD AUTO: 6.87 K/UL

## 2019-06-07 ENCOUNTER — MOBILE ON CALL (OUTPATIENT)
Age: 49
End: 2019-06-07

## 2019-06-12 ENCOUNTER — TRANSCRIPTION ENCOUNTER (OUTPATIENT)
Age: 49
End: 2019-06-12

## 2019-07-11 ENCOUNTER — APPOINTMENT (OUTPATIENT)
Dept: RHEUMATOLOGY | Facility: CLINIC | Age: 49
End: 2019-07-11
Payer: COMMERCIAL

## 2019-07-11 VITALS
WEIGHT: 215 LBS | TEMPERATURE: 97.7 F | HEIGHT: 68 IN | BODY MASS INDEX: 32.58 KG/M2 | OXYGEN SATURATION: 97 % | HEART RATE: 63 BPM | SYSTOLIC BLOOD PRESSURE: 107 MMHG | RESPIRATION RATE: 16 BRPM | DIASTOLIC BLOOD PRESSURE: 71 MMHG

## 2019-07-11 PROCEDURE — 20610 DRAIN/INJ JOINT/BURSA W/O US: CPT | Mod: 50

## 2019-07-11 RX ADMIN — Medication 0 MG/2ML: at 00:00

## 2019-07-11 NOTE — PROCEDURE
[Today's Date:] : Date: [unfilled] [Risks] : risks [Patient] : the patient [Consent Obtained] : written consent was obtained prior to the procedure and is detailed in the patient's record [Therapeutic] : therapeutic [Benefits] : benefits [Ethyl Chloride] : ethyl chloride [#1 Site: ______] : #1 site identified in the [unfilled] [#2 Site: ___] : # 2 site identified in the [unfilled] [Betadine] : with betadine solution [25 gauge 1.5 inch] : A 25 gauge 1.5 inch needle was used [___ml Synvisc 3] : [unfilled] ml of synvisc 3 [Tolerated Well] : the patient tolerated the procedure well [No Complications] : there were no complications [Patient Instructed to Call] : patient was instructed to call if redness at site, a decrease in range of motion or an increase in pain is noted after procedure.

## 2019-07-11 NOTE — ASSESSMENT
[FreeTextEntry1] : Patient receives round 1 of 3 of SynVisc:\par \par Patient will benefit from continued physical therapy to help with joint mobility and muscle strengthening.  Encouraged to hydrate and minimal weight bearing for the next 24 hours along with ice application 20 mins on/ 20 mins off.  she is in agreement with the above plan and will return in one weeks' time.\par

## 2019-07-18 ENCOUNTER — APPOINTMENT (OUTPATIENT)
Dept: RHEUMATOLOGY | Facility: CLINIC | Age: 49
End: 2019-07-18
Payer: COMMERCIAL

## 2019-07-18 VITALS
RESPIRATION RATE: 16 BRPM | HEIGHT: 68 IN | TEMPERATURE: 98.4 F | DIASTOLIC BLOOD PRESSURE: 86 MMHG | BODY MASS INDEX: 33.04 KG/M2 | WEIGHT: 218 LBS | OXYGEN SATURATION: 97 % | SYSTOLIC BLOOD PRESSURE: 122 MMHG | HEART RATE: 67 BPM

## 2019-07-18 PROCEDURE — 20610 DRAIN/INJ JOINT/BURSA W/O US: CPT | Mod: 50

## 2019-07-18 RX ADMIN — Medication 0 MG/2ML: at 00:00

## 2019-07-18 NOTE — PROCEDURE
[Today's Date:] : Date: [unfilled] [Patient] : the patient [Risks] : risks [Benefits] : benefits [Consent Obtained] : written consent was obtained prior to the procedure and is detailed in the patient's record [Therapeutic] : therapeutic [#1 Site: ______] : #1 site identified in the [unfilled] [Ethyl Chloride] : ethyl chloride [#2 Site: ___] : # 2 site identified in the [unfilled] [Betadine] : with betadine solution [25 gauge 1.5 inch] : A 25 gauge 1.5 inch needle was used [___ml Synvisc 3] : [unfilled] ml of synvisc 3 [Tolerated Well] : the patient tolerated the procedure well [No Complications] : there were no complications [Patient Instructed to Call] : patient was instructed to call if redness at site, a decrease in range of motion or an increase in pain is noted after procedure.

## 2019-07-18 NOTE — ASSESSMENT
[FreeTextEntry1] : Patient receives round #2 of 3 of SynVisc:\par \par Patient will benefit from continued physical therapy to help with joint mobility and muscle strengthening.  Encouraged to hydrate and minimal weight bearing for the next 24 hours along with ice application 20 mins on/ 20 mins off.  She is in agreement with the above plan and will return in one weeks' time.\par

## 2019-07-19 RX ORDER — HYLAN G-F 20 16MG/2ML
16 SYRINGE (ML) INTRAARTICULAR
Qty: 0 | Refills: 0 | Status: COMPLETED | OUTPATIENT
Start: 2019-07-11

## 2019-07-19 RX ORDER — HYLAN G-F 20 16MG/2ML
16 SYRINGE (ML) INTRAARTICULAR
Qty: 0 | Refills: 0 | Status: COMPLETED | OUTPATIENT
Start: 2019-07-18

## 2019-07-25 ENCOUNTER — APPOINTMENT (OUTPATIENT)
Dept: RHEUMATOLOGY | Facility: CLINIC | Age: 49
End: 2019-07-25
Payer: COMMERCIAL

## 2019-07-25 VITALS — HEART RATE: 85 BPM | SYSTOLIC BLOOD PRESSURE: 115 MMHG | DIASTOLIC BLOOD PRESSURE: 73 MMHG

## 2019-07-25 PROCEDURE — 20610 DRAIN/INJ JOINT/BURSA W/O US: CPT | Mod: 50

## 2019-07-25 RX ORDER — HYLAN G-F 20 16MG/2ML
16 SYRINGE (ML) INTRAARTICULAR
Qty: 0 | Refills: 0 | Status: COMPLETED | OUTPATIENT
Start: 2019-07-25

## 2019-07-25 RX ADMIN — Medication 0 MG/2ML: at 00:00

## 2019-07-26 NOTE — ASSESSMENT
[FreeTextEntry1] : Patient receives round #3 of 3 of SynVisc:\par \par Patient will benefit from continued physical therapy to help with joint mobility and muscle strengthening.  Encouraged to hydrate and minimal weight bearing for the next 24 hours along with ice application 20 mins on/ 20 mins off.  She is in agreement with the above plan and will return in three months' time.\par

## 2019-08-07 NOTE — ASSESSMENT
[FreeTextEntry1] : 47 yo with benign thyroid cyst,obesity,musculoskeletal pain,ie low back,knees.improved with gel knee inj.\par will f/u with labs.
Skin normal color for race, warm, dry and intact. No evidence of rash.
Barthel Index: Feeding Score __10_, Bathing Score _5__, Grooming Score ___5, Dressing Score _10__, Bowels Score _10__, Bladder Score ___10, Toilet Score _10__, Transfers Score __10_, Mobility Score _0__, Stairs Score __0_,     Total Score _70__

## 2019-09-03 ENCOUNTER — RX RENEWAL (OUTPATIENT)
Age: 49
End: 2019-09-03

## 2019-10-07 ENCOUNTER — RX RENEWAL (OUTPATIENT)
Age: 49
End: 2019-10-07

## 2019-10-31 ENCOUNTER — APPOINTMENT (OUTPATIENT)
Dept: RHEUMATOLOGY | Facility: CLINIC | Age: 49
End: 2019-10-31
Payer: COMMERCIAL

## 2019-10-31 VITALS
RESPIRATION RATE: 16 BRPM | HEIGHT: 68 IN | HEART RATE: 75 BPM | OXYGEN SATURATION: 99 % | DIASTOLIC BLOOD PRESSURE: 64 MMHG | TEMPERATURE: 98.3 F | SYSTOLIC BLOOD PRESSURE: 115 MMHG

## 2019-10-31 PROCEDURE — 99213 OFFICE O/P EST LOW 20 MIN: CPT

## 2019-11-01 LAB
25(OH)D3 SERPL-MCNC: 14.7 NG/ML
CRP SERPL-MCNC: <0.1 MG/DL
ERYTHROCYTE [SEDIMENTATION RATE] IN BLOOD BY WESTERGREN METHOD: 25 MM/HR
RHEUMATOID FACT SER QL: <10 IU/ML
TSH SERPL-ACNC: 1.14 UIU/ML

## 2019-11-01 NOTE — REVIEW OF SYSTEMS
[Fever] : no fever [Chills] : no chills [Eye Pain] : no eye pain [Sore Throat] : no sore throat [Hoarseness] : no hoarseness [Chest Pain] : no chest pain [Palpitations] : no palpitations [Shortness Of Breath] : no shortness of breath [Cough] : no cough [Heartburn] : no heartburn [Arthralgias] : arthralgias [Joint Swelling] : joint swelling [Joint Stiffness] : joint stiffness [Skin Lesions] : no skin lesions [Difficulty Walking] : difficulty walking [Depression] : no depression [Muscle Weakness] : muscle weakness

## 2019-11-01 NOTE — HISTORY OF PRESENT ILLNESS
[FreeTextEntry1] : Patient reports the change in cold weather has her experiencing stiffness in hands and is not fully able to close hands as once before. She reports mild resurfacing of pain along the right knee as well. She is concerned she is feeling overall body stiffness making her feel older than her chronologic age.  She reports fatigue.  She notes heaviness around hips and lower back. \par Patient finds viscosupplementation received three months prior moderately improved mobility save the resurfacing of stiffness along the right medial joint line after walking 10 - 12 blocks. \par She otherwise denies accompanied joint swelling, paresthesias or motor disturbances.\par

## 2019-11-01 NOTE — PHYSICAL EXAM
[General Appearance - Alert] : alert [General Appearance - In No Acute Distress] : in no acute distress [Sclera] : the sclera and conjunctiva were normal [Oropharynx] : the oropharynx was normal [Examination Of The Oral Cavity] : the lips and gums were normal [Neck Appearance] : the appearance of the neck was normal [] : no respiratory distress [Heart Rate And Rhythm] : heart rate was normal and rhythm regular [Edema] : there was no peripheral edema [No Spinal Tenderness] : no spinal tenderness [Nail Clubbing] : no clubbing  or cyanosis of the fingernails [Musculoskeletal - Swelling] : no joint swelling seen [Motor Tone] : muscle strength and tone were normal [Motor Exam] : the motor exam was normal [Oriented To Time, Place, And Person] : oriented to person, place, and time [Impaired Insight] : insight and judgment were intact [FreeTextEntry1] : tearful

## 2019-11-01 NOTE — ASSESSMENT
[FreeTextEntry1] : Patient with knee DJD and LBP, resurfacing of pain and hand stiffness:\par   \par Patient to have inflammatory markers and serologies drawn to assess for an underlying inflammatory process lending to presentation in the setting of fatigue and joint pain.  Patient will benefit from continued physical therapy to help with joint mobility and muscle strengthening of the knees and lower back.  Weight loss has been encouraged to reduce load over the medial joint line.  Viscosupplementation has been encouraged to provide additional lubrication and joint support.  In the interim, Diclofenac gel has been prescribed.  PT referral given. Compression stockings recommended.\par The importance of dietary and lifestyle modifications was reiterated along with discussions on relaxation, stress-reducing techniques and importance of good sleep hygiene. \par Vit D repleted which may be contributing to symptoms of bone pain and fatigue as well.  \par \par She is in agreement with the above plan and will return in three months' time.\par \par

## 2019-11-04 LAB — ANA SER IF-ACNC: NEGATIVE

## 2019-11-07 LAB
CCP AB SER IA-ACNC: 23 UNITS
RF+CCP IGG SER-IMP: ABNORMAL

## 2019-12-06 ENCOUNTER — RX RENEWAL (OUTPATIENT)
Age: 49
End: 2019-12-06

## 2019-12-06 RX ORDER — LIDOCAINE 5% 700 MG/1
5 PATCH TOPICAL
Qty: 15 | Refills: 0 | Status: ACTIVE | COMMUNITY
Start: 2018-10-24 | End: 1900-01-01

## 2019-12-07 ENCOUNTER — RX RENEWAL (OUTPATIENT)
Age: 49
End: 2019-12-07

## 2019-12-18 ENCOUNTER — RX RENEWAL (OUTPATIENT)
Age: 49
End: 2019-12-18

## 2020-01-09 ENCOUNTER — APPOINTMENT (OUTPATIENT)
Dept: RHEUMATOLOGY | Facility: CLINIC | Age: 50
End: 2020-01-09
Payer: COMMERCIAL

## 2020-01-09 VITALS
HEIGHT: 68 IN | OXYGEN SATURATION: 100 % | WEIGHT: 217 LBS | DIASTOLIC BLOOD PRESSURE: 57 MMHG | TEMPERATURE: 98.3 F | RESPIRATION RATE: 16 BRPM | HEART RATE: 77 BPM | BODY MASS INDEX: 32.89 KG/M2 | SYSTOLIC BLOOD PRESSURE: 123 MMHG

## 2020-01-09 PROCEDURE — 20610 DRAIN/INJ JOINT/BURSA W/O US: CPT | Mod: 50

## 2020-01-09 RX ORDER — HYLAN G-F 20 16MG/2ML
16 SYRINGE (ML) INTRAARTICULAR
Qty: 0 | Refills: 0 | Status: COMPLETED | OUTPATIENT
Start: 2020-01-09

## 2020-01-09 RX ADMIN — Medication 0 MG/2ML: at 00:00

## 2020-01-10 NOTE — PROCEDURE
[Other Date:___] : Date: [unfilled] [Patient] : the patient [Risks] : risks [Benefits] : benefits [Consent Obtained] : written consent was obtained prior to the procedure and is detailed in the patient's record [Therapeutic] : therapeutic [#1 Site: ______] : #1 site identified in the [unfilled] [Ethyl Chloride] : ethyl chloride [#2 Site: ___] : # 2 site identified in the [unfilled] [Betadine] : with betadine solution [25 gauge 1.5 inch] : A 25 gauge 1.5 inch needle was used [___ml Synvisc 3] : [unfilled] ml of synvisc 3 [No Complications] : there were no complications [Tolerated Well] : the patient tolerated the procedure well [Patient Instructed to Call] : patient was instructed to call if redness at site, a decrease in range of motion or an increase in pain is noted after procedure.

## 2020-01-10 NOTE — ASSESSMENT
[FreeTextEntry1] : Patient receives round #1 of 3 of SynVisc:\par \par Patient will benefit from continued physical therapy to help with joint mobility and muscle strengthening.  Encouraged to hydrate and minimal weight bearing for the next 24 hours along with ice application 20 mins on/ 20 mins off.  She is in agreement with the above plan and will return in three months' time.\par

## 2020-01-12 ENCOUNTER — RX RENEWAL (OUTPATIENT)
Age: 50
End: 2020-01-12

## 2020-01-16 ENCOUNTER — APPOINTMENT (OUTPATIENT)
Dept: RHEUMATOLOGY | Facility: CLINIC | Age: 50
End: 2020-01-16
Payer: COMMERCIAL

## 2020-01-16 VITALS
HEART RATE: 88 BPM | WEIGHT: 224 LBS | RESPIRATION RATE: 16 BRPM | DIASTOLIC BLOOD PRESSURE: 81 MMHG | HEIGHT: 68 IN | OXYGEN SATURATION: 97 % | BODY MASS INDEX: 33.95 KG/M2 | TEMPERATURE: 97.9 F | SYSTOLIC BLOOD PRESSURE: 135 MMHG

## 2020-01-16 PROCEDURE — 20610 DRAIN/INJ JOINT/BURSA W/O US: CPT | Mod: 50

## 2020-01-16 RX ORDER — HYLAN G-F 20 16MG/2ML
16 SYRINGE (ML) INTRAARTICULAR
Qty: 0 | Refills: 0 | Status: COMPLETED | OUTPATIENT
Start: 2020-01-16

## 2020-01-16 RX ADMIN — Medication 0 MG/2ML: at 00:00

## 2020-01-16 NOTE — ASSESSMENT
[FreeTextEntry1] : Patient receives round #2 of 3 of SynVisc:\par \par Patient will benefit from continued physical therapy to help with joint mobility and muscle strengthening.  Encouraged to hydrate and minimal weight bearing for the next 24 hours along with ice application 20 mins on/ 20 mins off.  She is in agreement with the above plan and will return in three months' time.\par

## 2020-01-23 ENCOUNTER — APPOINTMENT (OUTPATIENT)
Dept: RHEUMATOLOGY | Facility: CLINIC | Age: 50
End: 2020-01-23
Payer: COMMERCIAL

## 2020-01-23 VITALS
OXYGEN SATURATION: 98 % | SYSTOLIC BLOOD PRESSURE: 132 MMHG | BODY MASS INDEX: 33.95 KG/M2 | HEART RATE: 74 BPM | TEMPERATURE: 98.2 F | RESPIRATION RATE: 16 BRPM | HEIGHT: 68 IN | DIASTOLIC BLOOD PRESSURE: 83 MMHG | WEIGHT: 224 LBS

## 2020-01-23 PROCEDURE — 20610 DRAIN/INJ JOINT/BURSA W/O US: CPT | Mod: 50

## 2020-01-23 RX ORDER — HYLAN G-F 20 16MG/2ML
16 SYRINGE (ML) INTRAARTICULAR
Qty: 0 | Refills: 0 | Status: COMPLETED | OUTPATIENT
Start: 2020-01-23

## 2020-01-23 RX ADMIN — Medication 0 MG/2ML: at 00:00

## 2020-01-23 NOTE — PROCEDURE
[Today's Date:] : Date: [unfilled] [Risks] : risks [Patient] : the patient [Benefits] : benefits [Therapeutic] : therapeutic [Consent Obtained] : written consent was obtained prior to the procedure and is detailed in the patient's record [Ethyl Chloride] : ethyl chloride [#1 Site: ______] : #1 site identified in the [unfilled] [#2 Site: ___] : # 2 site identified in the [unfilled] [25 gauge 1.5 inch] : A 25 gauge 1.5 inch needle was used [Betadine] : with betadine solution [___ml Synvisc 3] : [unfilled] ml of synvisc 3 [Tolerated Well] : the patient tolerated the procedure well [No Complications] : there were no complications [Patient Instructed to Call] : patient was instructed to call if redness at site, a decrease in range of motion or an increase in pain is noted after procedure.

## 2020-01-24 RX ORDER — HYLAN G-F 20 16MG/2ML
16 SYRINGE (ML) INTRAARTICULAR
Qty: 0 | Refills: 0 | Status: COMPLETED | OUTPATIENT
Start: 2020-01-23

## 2020-01-26 ENCOUNTER — RX RENEWAL (OUTPATIENT)
Age: 50
End: 2020-01-26

## 2020-02-14 ENCOUNTER — RX RENEWAL (OUTPATIENT)
Age: 50
End: 2020-02-14

## 2020-04-13 ENCOUNTER — RX RENEWAL (OUTPATIENT)
Age: 50
End: 2020-04-13

## 2020-04-22 ENCOUNTER — APPOINTMENT (OUTPATIENT)
Dept: INTERNAL MEDICINE | Facility: CLINIC | Age: 50
End: 2020-04-22
Payer: COMMERCIAL

## 2020-04-22 PROCEDURE — 99442: CPT

## 2020-04-22 PROCEDURE — 99214 OFFICE O/P EST MOD 30 MIN: CPT

## 2020-04-22 PROCEDURE — 99443: CPT

## 2020-04-23 ENCOUNTER — APPOINTMENT (OUTPATIENT)
Dept: RHEUMATOLOGY | Facility: CLINIC | Age: 50
End: 2020-04-23
Payer: COMMERCIAL

## 2020-04-23 PROCEDURE — 99213 OFFICE O/P EST LOW 20 MIN: CPT | Mod: 95

## 2020-04-24 NOTE — ASSESSMENT
[FreeTextEntry1] : Patient with knee DJD and LBP: \par   \par Patient currently involved in exercise regimen.  Weight loss has been encouraged to reduce load over the medial joint line.  Viscosupplementation has been encouraged to provide additional lubrication and joint support.  In the interim, Diclofenac gel has been prescribed.   Compression stockings recommended.\par Will continue to monitor for inflammatory symptoms in the setting of weakly positive CCP antibody.  Patient is currently without systemic symptoms.  The importance of dietary and lifestyle modifications was reiterated along with discussions on relaxation, stress-reducing techniques and importance of good sleep hygiene. \par \par \par She is in agreement with the above plan and will return in three months' time.\par \par

## 2020-04-24 NOTE — PHYSICAL EXAM
[General Appearance - Alert] : alert [] : no respiratory distress [General Appearance - In No Acute Distress] : in no acute distress [Oriented To Time, Place, And Person] : oriented to person, place, and time [Impaired Insight] : insight and judgment were intact [FreeTextEntry1] : assessed via a/v platform

## 2020-04-24 NOTE — REVIEW OF SYSTEMS
[Cough] : cough [Joint Pain] : joint pain [Joint Stiffness] : joint stiffness [Fever] : no fever [Chills] : no chills [Feeling Poorly] : not feeling poorly [Sore Throat] : no sore throat [Hoarseness] : no hoarseness [Chest Pain] : no chest pain [SOB on Exertion] : no shortness of breath during exertion [Palpitations] : no palpitations [Limb Weakness] : no limb weakness [Skin Lesions] : no skin lesions [Joint Swelling] : no joint swelling [Muscle Weakness] : no muscle weakness [Feelings Of Weakness] : no feelings of weakness [Depression] : no depression

## 2020-04-24 NOTE — HISTORY OF PRESENT ILLNESS
[Home] : at home, [unfilled] , at the time of the visit. [Medical Office: ([facility])___] : at the medical office located in  [Patient] : the patient [Self] : self [FreeTextEntry1] : Patient requesting telehealth visitl to discuss health and medications in lieu of physical visit secondary to COVID-19  pandemic .Patient reports a dry cough over the last few weeks along with headache. She reports father with presumed covid- 19 and is awaiting his testing arranged by primary care team. She otherwise denies dyspnea or fevers. She reports performing ADLs on a continual basis.  She is currently cycling with a sister and performing exercise modification, frequently visiting drugstore and grocery markets with proper precautions during pandemic. Knees with intermittent stiffness; patient has discontinued gabapentin as well as naproxen and continues currently on Tylenol sinus medication. \par She otherwise denies joint swelling or systemic symptoms.\par \par \par

## 2020-05-13 ENCOUNTER — RX RENEWAL (OUTPATIENT)
Age: 50
End: 2020-05-13

## 2020-07-11 LAB
C DIFF TOX GENS STL QL NAA+PROBE: NORMAL
CDIFF BY PCR: NOT DETECTED

## 2020-07-13 LAB — BACTERIA STL CULT: NORMAL

## 2020-08-18 ENCOUNTER — APPOINTMENT (OUTPATIENT)
Dept: INTERNAL MEDICINE | Facility: CLINIC | Age: 50
End: 2020-08-18
Payer: COMMERCIAL

## 2020-08-18 VITALS
BODY MASS INDEX: 34.1 KG/M2 | HEART RATE: 62 BPM | WEIGHT: 225 LBS | DIASTOLIC BLOOD PRESSURE: 81 MMHG | HEIGHT: 68 IN | SYSTOLIC BLOOD PRESSURE: 118 MMHG | OXYGEN SATURATION: 99 % | TEMPERATURE: 97.9 F | RESPIRATION RATE: 16 BRPM

## 2020-08-18 DIAGNOSIS — Z12.39 ENCOUNTER FOR OTHER SCREENING FOR MALIGNANT NEOPLASM OF BREAST: ICD-10-CM

## 2020-08-18 LAB
25(OH)D3 SERPL-MCNC: 28.8 NG/ML
ALBUMIN SERPL ELPH-MCNC: 4.2 G/DL
ALP BLD-CCNC: 76 U/L
ALT SERPL-CCNC: 30 U/L
ANION GAP SERPL CALC-SCNC: 11 MMOL/L
APPEARANCE: CLEAR
AST SERPL-CCNC: 26 U/L
BASOPHILS # BLD AUTO: 0.03 K/UL
BASOPHILS NFR BLD AUTO: 0.5 %
BILIRUB SERPL-MCNC: 0.6 MG/DL
BILIRUBIN URINE: NEGATIVE
BLOOD URINE: NEGATIVE
BUN SERPL-MCNC: 14 MG/DL
CALCIUM SERPL-MCNC: 9.1 MG/DL
CHLORIDE SERPL-SCNC: 105 MMOL/L
CHOLEST SERPL-MCNC: 205 MG/DL
CHOLEST/HDLC SERPL: 3.9 RATIO
CO2 SERPL-SCNC: 24 MMOL/L
COLOR: NORMAL
CREAT SERPL-MCNC: 0.66 MG/DL
EOSINOPHIL # BLD AUTO: 0.16 K/UL
EOSINOPHIL NFR BLD AUTO: 2.8 %
ESTIMATED AVERAGE GLUCOSE: 111 MG/DL
GLUCOSE QUALITATIVE U: NEGATIVE
GLUCOSE SERPL-MCNC: 92 MG/DL
HBA1C MFR BLD HPLC: 5.5 %
HCT VFR BLD CALC: 35.5 %
HDLC SERPL-MCNC: 52 MG/DL
HGB BLD-MCNC: 11.2 G/DL
IMM GRANULOCYTES NFR BLD AUTO: 0.3 %
KETONES URINE: NEGATIVE
LDLC SERPL CALC-MCNC: 129 MG/DL
LEUKOCYTE ESTERASE URINE: NEGATIVE
LYMPHOCYTES # BLD AUTO: 1.7 K/UL
LYMPHOCYTES NFR BLD AUTO: 29.3 %
MAN DIFF?: NORMAL
MCHC RBC-ENTMCNC: 28.4 PG
MCHC RBC-ENTMCNC: 31.5 GM/DL
MCV RBC AUTO: 89.9 FL
MONOCYTES # BLD AUTO: 0.36 K/UL
MONOCYTES NFR BLD AUTO: 6.2 %
NEUTROPHILS # BLD AUTO: 3.54 K/UL
NEUTROPHILS NFR BLD AUTO: 60.9 %
NITRITE URINE: NEGATIVE
PH URINE: 6.5
PLATELET # BLD AUTO: 334 K/UL
POTASSIUM SERPL-SCNC: 4.4 MMOL/L
PROT SERPL-MCNC: 6.4 G/DL
PROTEIN URINE: NEGATIVE
RBC # BLD: 3.95 M/UL
RBC # FLD: 14.3 %
SODIUM SERPL-SCNC: 141 MMOL/L
SPECIFIC GRAVITY URINE: 1.02
TRIGL SERPL-MCNC: 117 MG/DL
TSH SERPL-ACNC: 1.34 UIU/ML
UROBILINOGEN URINE: NORMAL
WBC # FLD AUTO: 5.81 K/UL

## 2020-08-18 PROCEDURE — 99213 OFFICE O/P EST LOW 20 MIN: CPT

## 2020-08-18 NOTE — HEALTH RISK ASSESSMENT
[No] : No [No falls in past year] : Patient reported no falls in the past year [0] : 2) Feeling down, depressed, or hopeless: Not at all (0) [] : No [[id number]] : 0

## 2020-08-18 NOTE — HISTORY OF PRESENT ILLNESS
[FreeTextEntry1] : low abd.pain,loose stools [de-identified] : 48 yo c/o daily episodes of low abd.pain,ass.with urge to go to [place],noticed loose stools x2-3 times a day since she was Dxed with COVID19 4 months ago.was Rx with Ax's(which ones?)\par denies n,v,loss of appetite,wt loss,GIB,systemic Sx's.C.Diff.was neg.4 months ago.\par h/o COVID19 IGG Ab+ test in [facility] in 04/20,with mild course,no need in CxR.\par pt is on polypharmacy for arthralgias,rosacea,HSV,etc.

## 2020-08-18 NOTE — PHYSICAL EXAM
[No Acute Distress] : no acute distress [Well Developed] : well developed [Well-Appearing] : well-appearing [Normal Sclera/Conjunctiva] : normal sclera/conjunctiva [EOMI] : extraocular movements intact [PERRL] : pupils equal round and reactive to light [Normal Outer Ear/Nose] : the outer ears and nose were normal in appearance [No JVD] : no jugular venous distention [Normal Oropharynx] : the oropharynx was normal [Supple] : supple [No Lymphadenopathy] : no lymphadenopathy [Thyroid Normal, No Nodules] : the thyroid was normal and there were no nodules present [No Respiratory Distress] : no respiratory distress  [Clear to Auscultation] : lungs were clear to auscultation bilaterally [No Accessory Muscle Use] : no accessory muscle use [Normal Rate] : normal rate  [Regular Rhythm] : with a regular rhythm [Normal S1, S2] : normal S1 and S2 [No Murmur] : no murmur heard [No Carotid Bruits] : no carotid bruits [No Abdominal Bruit] : a ~M bruit was not heard ~T in the abdomen [No Varicosities] : no varicosities [Pedal Pulses Present] : the pedal pulses are present [No Edema] : there was no peripheral edema [Normal Appearance] : normal in appearance [No Nipple Discharge] : no nipple discharge [No Axillary Lymphadenopathy] : no axillary lymphadenopathy [Non Tender] : non-tender [Non-distended] : non-distended [Soft] : abdomen soft [No HSM] : no HSM [Normal Bowel Sounds] : normal bowel sounds [Normal Posterior Cervical Nodes] : no posterior cervical lymphadenopathy [No Masses] : no abdominal mass palpated [No CVA Tenderness] : no CVA  tenderness [Normal Anterior Cervical Nodes] : no anterior cervical lymphadenopathy [No Spinal Tenderness] : no spinal tenderness [Grossly Normal Strength/Tone] : grossly normal strength/tone [No Rash] : no rash [No Joint Swelling] : no joint swelling [Normal Gait] : normal gait [No Focal Deficits] : no focal deficits [Normal Affect] : the affect was normal [Normal Insight/Judgement] : insight and judgment were intact [de-identified] : obese Do Not Resuscitate (DNR)

## 2020-08-18 NOTE — ASSESSMENT
[FreeTextEntry1] : 48 yo referred to GI for evaluation of new onset of IBS like Sx's.\par pt is post COVID 19+ mild course in Apr.2020.

## 2020-09-03 ENCOUNTER — APPOINTMENT (OUTPATIENT)
Dept: RHEUMATOLOGY | Facility: CLINIC | Age: 50
End: 2020-09-03
Payer: COMMERCIAL

## 2020-09-03 VITALS
BODY MASS INDEX: 34.56 KG/M2 | WEIGHT: 228 LBS | HEIGHT: 68 IN | SYSTOLIC BLOOD PRESSURE: 130 MMHG | DIASTOLIC BLOOD PRESSURE: 73 MMHG | HEART RATE: 80 BPM | RESPIRATION RATE: 16 BRPM | TEMPERATURE: 97.7 F | OXYGEN SATURATION: 98 %

## 2020-09-03 PROCEDURE — 99213 OFFICE O/P EST LOW 20 MIN: CPT

## 2020-09-04 NOTE — PHYSICAL EXAM
[General Appearance - Alert] : alert [General Appearance - In No Acute Distress] : in no acute distress [Edema] : there was no peripheral edema [No Spinal Tenderness] : no spinal tenderness [Motor Tone] : muscle strength and tone were normal [Musculoskeletal - Swelling] : no joint swelling seen [Nail Clubbing] : no clubbing  or cyanosis of the fingernails [Motor Exam] : the motor exam was normal [Impaired Insight] : insight and judgment were intact [Oriented To Time, Place, And Person] : oriented to person, place, and time [] : no respiratory distress [FreeTextEntry1] : Spider veins over the popliteal fossa and LE

## 2020-09-04 NOTE — ASSESSMENT
[FreeTextEntry1] : Patient with knee DJD and LBP: \par Patient will benefit from continued physical therapy to help with joint mobility and muscle strengthening of the knees and lower back.  Neuropathic pain tx rec. Weight loss has been encouraged to reduce load over the medial joint line.  Viscosupplementation has been encouraged to provide additional lubrication and joint support.  \par Compression stockings recommended.\par The importance of dietary and lifestyle modifications was reiterated along with discussions on relaxation, stress-reducing techniques and importance of good sleep hygiene. \par  \par \par She is in agreement with the above plan and will return in three months' time.\par \par

## 2020-09-04 NOTE — HISTORY OF PRESENT ILLNESS
[FreeTextEntry1] : Patient reports resurfacing of knee pain b/l as well as the lower back, both pronounced after recovering from covid-19 infection.  She notes heaviness around hips and lower back. She notes laxity symptoms after walking a few blocks. She c/w knee braces and neuropathic pain tx.  Patient finds viscosupplementation received prior moderately improves mobility.\par She otherwise denies accompanied joint swelling, paresthesias or motor disturbances.\par

## 2020-09-04 NOTE — REVIEW OF SYSTEMS
[Arthralgias] : arthralgias [Joint Stiffness] : joint stiffness [Muscle Weakness] : muscle weakness [Difficulty Walking] : difficulty walking [Chills] : no chills [Fever] : no fever [Sore Throat] : no sore throat [Hoarseness] : no hoarseness [Eye Pain] : no eye pain [Chest Pain] : no chest pain [Palpitations] : no palpitations [Shortness Of Breath] : no shortness of breath [Joint Swelling] : no joint swelling [Heartburn] : no heartburn [Cough] : no cough [Depression] : no depression [Skin Lesions] : no skin lesions

## 2020-10-13 ENCOUNTER — TRANSCRIPTION ENCOUNTER (OUTPATIENT)
Age: 50
End: 2020-10-13

## 2020-10-16 ENCOUNTER — TRANSCRIPTION ENCOUNTER (OUTPATIENT)
Age: 50
End: 2020-10-16

## 2020-10-20 ENCOUNTER — TRANSCRIPTION ENCOUNTER (OUTPATIENT)
Age: 50
End: 2020-10-20

## 2020-10-21 ENCOUNTER — TRANSCRIPTION ENCOUNTER (OUTPATIENT)
Age: 50
End: 2020-10-21

## 2020-11-05 ENCOUNTER — APPOINTMENT (OUTPATIENT)
Dept: RHEUMATOLOGY | Facility: CLINIC | Age: 50
End: 2020-11-05
Payer: COMMERCIAL

## 2020-11-05 VITALS
SYSTOLIC BLOOD PRESSURE: 138 MMHG | HEIGHT: 68 IN | HEART RATE: 97 BPM | BODY MASS INDEX: 34.25 KG/M2 | WEIGHT: 226 LBS | TEMPERATURE: 98 F | OXYGEN SATURATION: 98 % | DIASTOLIC BLOOD PRESSURE: 82 MMHG | RESPIRATION RATE: 16 BRPM

## 2020-11-05 PROCEDURE — 99072 ADDL SUPL MATRL&STAF TM PHE: CPT

## 2020-11-05 PROCEDURE — 20610 DRAIN/INJ JOINT/BURSA W/O US: CPT | Mod: 50

## 2020-11-05 RX ORDER — HYLAN G-F 20 16MG/2ML
16 SYRINGE (ML) INTRAARTICULAR
Qty: 0 | Refills: 0 | Status: COMPLETED | OUTPATIENT
Start: 2020-11-05

## 2020-11-05 RX ADMIN — Medication 0 MG/2ML: at 00:00

## 2020-11-06 NOTE — PROCEDURE
[Other Date:___] : Date: [unfilled] [Patient] : the patient [Risks] : risks [Benefits] : benefits [Consent Obtained] : written consent was obtained prior to the procedure and is detailed in the patient's record [Therapeutic] : therapeutic [#1 Site: ______] : #1 site identified in the [unfilled] [#2 Site: ___] : # 2 site identified in the [unfilled] [Ethyl Chloride] : ethyl chloride [Betadine] : with betadine solution [25 gauge 1.5 inch] : A 25 gauge 1.5 inch needle was used [___ml Synvisc 3] : [unfilled] ml of synvisc 3 [Tolerated Well] : the patient tolerated the procedure well [No Complications] : there were no complications [Patient Instructed to Call] : patient was instructed to call if redness at site, a decrease in range of motion or an increase in pain is noted after procedure.

## 2020-11-06 NOTE — ASSESSMENT
[FreeTextEntry1] : Patient receives Ingresse series #1 of 3 :\par \par Patient will benefit from continued physical therapy to help with joint mobility and muscle strengthening.  Encouraged to hydrate and minimal weight bearing for the next 24 hours along with ice application 20 mins on/ 20 mins off.  She is in agreement with the above plan and will return in one weeks' time.\par

## 2020-11-09 ENCOUNTER — TRANSCRIPTION ENCOUNTER (OUTPATIENT)
Age: 50
End: 2020-11-09

## 2020-11-11 ENCOUNTER — TRANSCRIPTION ENCOUNTER (OUTPATIENT)
Age: 50
End: 2020-11-11

## 2020-11-12 ENCOUNTER — APPOINTMENT (OUTPATIENT)
Dept: RHEUMATOLOGY | Facility: CLINIC | Age: 50
End: 2020-11-12
Payer: COMMERCIAL

## 2020-11-12 VITALS
RESPIRATION RATE: 16 BRPM | OXYGEN SATURATION: 99 % | DIASTOLIC BLOOD PRESSURE: 68 MMHG | HEIGHT: 68 IN | TEMPERATURE: 98.2 F | SYSTOLIC BLOOD PRESSURE: 126 MMHG | BODY MASS INDEX: 34.25 KG/M2 | WEIGHT: 226 LBS | HEART RATE: 81 BPM

## 2020-11-12 PROCEDURE — 99072 ADDL SUPL MATRL&STAF TM PHE: CPT

## 2020-11-12 PROCEDURE — 20610 DRAIN/INJ JOINT/BURSA W/O US: CPT | Mod: 50

## 2020-11-12 RX ORDER — HYLAN G-F 20 16MG/2ML
16 SYRINGE (ML) INTRAARTICULAR
Qty: 0 | Refills: 0 | Status: COMPLETED | OUTPATIENT
Start: 2020-11-12

## 2020-11-12 RX ADMIN — Medication 0 MG/2ML: at 00:00

## 2020-11-12 NOTE — PROCEDURE
[#2 Site: ___] : # 2 site identified in the [unfilled] [Ethyl Chloride] : ethyl chloride [Betadine] : with betadine solution [25 gauge 1.5 inch] : A 25 gauge 1.5 inch needle was used [___ml Synvisc 3] : [unfilled] ml of synvisc 3 [Tolerated Well] : the patient tolerated the procedure well [No Complications] : there were no complications [Patient Instructed to Call] : patient was instructed to call if redness at site, a decrease in range of motion or an increase in pain is noted after procedure. [Other Date:___] : Date: [unfilled]

## 2020-11-13 LAB
ABO + RH PNL BLD: NORMAL
CCP AB SER IA-ACNC: 15 UNITS
CRP SERPL-MCNC: <0.1 MG/DL
ERYTHROCYTE [SEDIMENTATION RATE] IN BLOOD BY WESTERGREN METHOD: 10 MM/HR
RF+CCP IGG SER-IMP: NEGATIVE
RHEUMATOID FACT SER QL: <10 IU/ML
SARS-COV-2 IGG SERPL IA-ACNC: 13 INDEX
SARS-COV-2 IGG SERPL QL IA: POSITIVE

## 2020-11-19 ENCOUNTER — APPOINTMENT (OUTPATIENT)
Dept: RHEUMATOLOGY | Facility: CLINIC | Age: 50
End: 2020-11-19
Payer: COMMERCIAL

## 2020-11-19 PROCEDURE — 20610 DRAIN/INJ JOINT/BURSA W/O US: CPT | Mod: 50

## 2020-11-19 RX ORDER — HYLAN G-F 20 16MG/2ML
16 SYRINGE (ML) INTRAARTICULAR
Qty: 0 | Refills: 0 | Status: COMPLETED | OUTPATIENT
Start: 2020-11-19

## 2020-11-19 RX ADMIN — Medication 0 MG/2ML: at 00:00

## 2020-11-19 NOTE — ASSESSMENT
[FreeTextEntry1] : Patient receives Synvisc series #2 of 3 in the R knee, #3 out of 3 in the L knee :\par \par Patient will benefit from continued physical therapy to help with joint mobility and muscle strengthening.  Encouraged to hydrate and minimal weight bearing for the next 24 hours along with ice application 20 mins on/ 20 mins off.  She is in agreement with the above plan and will return in one weeks' time.\par

## 2020-11-19 NOTE — ASSESSMENT
[FreeTextEntry1] : Patient receives Synvisc series #2 of 3 in the L knee  :\par \par Patient will benefit from continued physical therapy to help with joint mobility and muscle strengthening.  Encouraged to hydrate and minimal weight bearing for the next 24 hours along with ice application 20 mins on/ 20 mins off.  She is in agreement with the above plan and will return in one weeks' time.\par

## 2020-11-19 NOTE — PROCEDURE
[Today's Date:] : Date: [unfilled] [Patient] : the patient [Risks] : risks [Benefits] : benefits [Consent Obtained] : written consent was obtained prior to the procedure and is detailed in the patient's record [Therapeutic] : therapeutic [#1 Site: ______] : #1 site identified in the [unfilled] [#2 Site: ___] : # 2 site identified in the [unfilled] [Ethyl Chloride] : ethyl chloride [Betadine] : with betadine solution [25 gauge 1.5 inch] : A 25 gauge 1.5 inch needle was used [___ml Synvisc 3] : [unfilled] ml of synvisc 3 [Tolerated Well] : the patient tolerated the procedure well [No Complications] : there were no complications [Patient Instructed to Call] : patient was instructed to call if redness at site, a decrease in range of motion or an increase in pain is noted after procedure.

## 2020-11-24 ENCOUNTER — TRANSCRIPTION ENCOUNTER (OUTPATIENT)
Age: 50
End: 2020-11-24

## 2020-12-03 ENCOUNTER — APPOINTMENT (OUTPATIENT)
Dept: RHEUMATOLOGY | Facility: CLINIC | Age: 50
End: 2020-12-03
Payer: COMMERCIAL

## 2020-12-03 VITALS — SYSTOLIC BLOOD PRESSURE: 114 MMHG | DIASTOLIC BLOOD PRESSURE: 72 MMHG

## 2020-12-03 PROCEDURE — 20610 DRAIN/INJ JOINT/BURSA W/O US: CPT | Mod: RT

## 2020-12-03 PROCEDURE — 99072 ADDL SUPL MATRL&STAF TM PHE: CPT

## 2020-12-03 RX ORDER — HYLAN G-F 20 16MG/2ML
16 SYRINGE (ML) INTRAARTICULAR
Qty: 0 | Refills: 0 | Status: COMPLETED | OUTPATIENT
Start: 2020-12-03

## 2020-12-03 RX ADMIN — Medication 0 MG/2ML: at 00:00

## 2020-12-03 NOTE — PROCEDURE
[Today's Date:] : Date: [unfilled] [Patient] : the patient [Risks] : risks [Benefits] : benefits [Consent Obtained] : written consent was obtained prior to the procedure and is detailed in the patient's record [Therapeutic] : therapeutic [#1 Site: ______] : #1 site identified in the [unfilled] [Ethyl Chloride] : ethyl chloride [Betadine] : betadine solution [25 gauge 1.5 inch] : A 25 gauge 1.5 inch needle was used [___ml Synvisc 3] : [unfilled] ml of synvisc 3 [Tolerated Well] : the patient tolerated the procedure well [No Complications] : there were no complications [Patient Instructed to Call] : patient was instructed to call if redness at site, a decrease in range of motion or an increase in pain is noted after procedure.

## 2020-12-03 NOTE — ASSESSMENT
[FreeTextEntry1] : Patient receives #3 of SynVisc- 3 series in the R knee :\par \par Patient will benefit from continued physical therapy to help with joint mobility and muscle strengthening.  Encouraged to hydrate and minimal weight bearing for the next 24 hours along with ice application 20 mins on/ 20 mins off.  She is in agreement with the above plan and will return in three months'' time.\par

## 2021-04-21 ENCOUNTER — APPOINTMENT (OUTPATIENT)
Dept: RHEUMATOLOGY | Facility: CLINIC | Age: 51
End: 2021-04-21
Payer: COMMERCIAL

## 2021-04-21 DIAGNOSIS — R53.83 OTHER FATIGUE: ICD-10-CM

## 2021-04-21 PROCEDURE — 99442: CPT

## 2021-05-07 LAB
25(OH)D3 SERPL-MCNC: 20.8 NG/ML
CHOLEST SERPL-MCNC: 182 MG/DL
COVID-19 SPIKE DOMAIN ANTIBODY INTERPRETATION: POSITIVE
CRP SERPL-MCNC: <3 MG/L
HDLC SERPL-MCNC: 48 MG/DL
LDLC SERPL CALC-MCNC: 114 MG/DL
NONHDLC SERPL-MCNC: 134 MG/DL
RHEUMATOID FACT SER QL: <10 IU/ML
SARS-COV-2 AB SERPL IA-ACNC: >250 U/ML
TRIGL SERPL-MCNC: 102 MG/DL
TSH SERPL-ACNC: 1.29 UIU/ML

## 2021-05-10 LAB
CCP AB SER IA-ACNC: 8 UNITS
ERYTHROCYTE [SEDIMENTATION RATE] IN BLOOD BY WESTERGREN METHOD: 27 MM/HR
ESTIMATED AVERAGE GLUCOSE: 108 MG/DL
HBA1C MFR BLD HPLC: 5.4 %
RF+CCP IGG SER-IMP: NEGATIVE

## 2021-05-12 ENCOUNTER — APPOINTMENT (OUTPATIENT)
Dept: RHEUMATOLOGY | Facility: CLINIC | Age: 51
End: 2021-05-12
Payer: COMMERCIAL

## 2021-05-12 DIAGNOSIS — M25.559 PAIN IN UNSPECIFIED HIP: ICD-10-CM

## 2021-05-12 LAB
ANACR T: NEGATIVE
HLA-B27 RELATED AG QL: POSITIVE

## 2021-05-12 PROCEDURE — 99214 OFFICE O/P EST MOD 30 MIN: CPT | Mod: 95

## 2021-05-12 NOTE — CONSULT LETTER
[Dear  ___] : Dear  [unfilled], [Courtesy Letter:] : I had the pleasure of seeing your patient, [unfilled], in my office today. [Please see my note below.] : Please see my note below. [Consult Closing:] : Thank you very much for allowing me to participate in the care of this patient.  If you have any questions, please do not hesitate to contact me. [Sincerely,] : Sincerely, [FreeTextEntry2] : Winsome Garcia MD \par [facility] [FreeTextEntry3] : Yamileth Reveles M.D.\par  of Medicine \par [facility] [employer] at [facility]/[place]\par \par

## 2021-05-12 NOTE — HISTORY OF PRESENT ILLNESS
[Home] : at home, [unfilled] , at the time of the visit. [Other Location: e.g. Home (Enter Location, City,State)___] : at [unfilled] [Verbal consent obtained from patient] : the patient, [unfilled] [FreeTextEntry1] : Patient has continued pain surrounding the hips; spine and hip films reflect DJD changes ; SI joint without erosive sequela.  Patient with a testing reflecting HLA-B27 positivity; patient with sedimentation rate of 27 mm/h otherwise normal markers. Patient describes father with pruritic skin is not clear of his dx of psoriasis.   Patient had a fleeting low titer CCP antibody in the past as well as a low titer CAITIE of 1:80 nucleolar pattern.  Many encounters ago,  the patient did have a patch over the elbow which she says was remedied by emollients application.  She denies photosensitivity or Raynaud's.\par \par She otherwise denies visual disturbances, oral ulcers, dyspnea, chest pain or motor sensory disturbance.

## 2021-05-12 NOTE — REVIEW OF SYSTEMS
[Arthralgias] : arthralgias [Joint Stiffness] : joint stiffness [Difficulty Walking] : difficulty walking [Easy Bleeding] : a tendency for easy bleeding [Fever] : no fever [Chills] : no chills [Dry Eyes] : no dryness of the eyes [Sore Throat] : no sore throat [Hoarseness] : no hoarseness [Chest Pain] : no chest pain [Palpitations] : no palpitations [Cough] : no cough [SOB on Exertion] : no shortness of breath during exertion [Joint Swelling] : no joint swelling [Skin Lesions] : no skin lesions [Sleep Disturbances] : no sleep disturbances [Feelings Of Weakness] : no feelings of weakness [Easy Bruising] : no tendency for easy bruising

## 2021-05-12 NOTE — ASSESSMENT
[FreeTextEntry1] : Patient has had long-standing arthralgias, bilateral knee pain secondary to mechanical disease; with progressive hip pain in the setting of HLA-B27 positivity:\par Over the last 10 years,  patient has been noting diffuse arthralgias from the hands, to the lower extremities.  With the HLA-B27 positivity, patient can be trialed with a TNF inhibitor for a spondyloarthropathy presentation. In cases of psoriatic arthritis, non-radiographic findings spondyloarthropathy are noted. Discussed increased risk of serious infections related to biologic therapy and the importance of regular vaccinations.Surveillance labs requested.\par Patient will benefit from physical therapy to help with joint mobility and muscle strengthening.  Quadriceps strengthening exercises demonstrated in the office.  Weight loss has been encouraged to reduce load over the medial joint line.  Viscosupplementation has been encouraged to provide additional lubrication and joint support.  In the interim, Diclofenac gel has been rec.  Core strengthening and exercise modification emphasized.\par She is in agreement with the above plan and will return in one months' time.\par

## 2021-05-19 ENCOUNTER — TRANSCRIPTION ENCOUNTER (OUTPATIENT)
Age: 51
End: 2021-05-19

## 2021-05-29 LAB
ERYTHROCYTE [SEDIMENTATION RATE] IN BLOOD BY WESTERGREN METHOD: 19 MM/HR
HAV IGM SER QL: NONREACTIVE
HBV CORE IGM SER QL: NONREACTIVE
HBV SURFACE AG SER QL: NONREACTIVE
HCV AB SER QL: NONREACTIVE
HCV S/CO RATIO: 0.13 S/CO

## 2021-06-01 LAB
C PNEUM IGG SER QL: NORMAL TITER
C PNEUM IGM SER QL: NORMAL TITER
C PSITTACI IGG SER QL: NORMAL TITER
C PSITTACI IGM SER QL: NORMAL TITER
C TRACH B IGG SER QL: ABNORMAL TITER
C TRACH B IGM SER QL: NORMAL TITER
M TB IFN-G BLD-IMP: NEGATIVE
QUANTIFERON TB PLUS MITOGEN MINUS NIL: 8.69 IU/ML
QUANTIFERON TB PLUS NIL: 0.02 IU/ML
QUANTIFERON TB PLUS TB1 MINUS NIL: -0.01 IU/ML
QUANTIFERON TB PLUS TB2 MINUS NIL: 0 IU/ML

## 2021-06-03 ENCOUNTER — NON-APPOINTMENT (OUTPATIENT)
Age: 51
End: 2021-06-03

## 2021-06-03 ENCOUNTER — APPOINTMENT (OUTPATIENT)
Dept: RHEUMATOLOGY | Facility: CLINIC | Age: 51
End: 2021-06-03
Payer: COMMERCIAL

## 2021-06-03 VITALS
SYSTOLIC BLOOD PRESSURE: 121 MMHG | HEIGHT: 68 IN | WEIGHT: 226 LBS | RESPIRATION RATE: 16 BRPM | BODY MASS INDEX: 34.25 KG/M2 | TEMPERATURE: 97.3 F | OXYGEN SATURATION: 95 % | DIASTOLIC BLOOD PRESSURE: 81 MMHG | HEART RATE: 73 BPM

## 2021-06-03 PROCEDURE — 96401 CHEMO ANTI-NEOPL SQ/IM: CPT

## 2021-06-03 PROCEDURE — 99213 OFFICE O/P EST LOW 20 MIN: CPT | Mod: 25

## 2021-06-03 PROCEDURE — 99072 ADDL SUPL MATRL&STAF TM PHE: CPT

## 2021-06-03 RX ORDER — ADALIMUMAB 40MG/0.4ML
40 KIT SUBCUTANEOUS
Qty: 0 | Refills: 0 | Status: COMPLETED | OUTPATIENT
Start: 2021-06-03

## 2021-06-03 RX ADMIN — ADALIMUMAB 0 MG/0.4ML: KIT at 00:00

## 2021-06-03 NOTE — HISTORY OF PRESENT ILLNESS
[FreeTextEntry1] : Patient returns for followup with blood testing reflecting normal surveillance labs including QF gold testing and hepatitis testing.  Prior IgG+ C. Trachomatis noted.\par Patient has continued pain surrounding the hips; spine and hip films reflect DJD changes ; SI joint without erosive sequela. Patient with a testing reflecting HLA-B27 positivity; patient with sedimentation rate of 27 mm/h otherwise normal markers. Patient describes father with pruritic skin and is not clear of his dx of psoriasis. Patient had a fleeting low titer CCP antibody in the past as well as a low titer CAITIE of 1:80 nucleolar pattern. Many encounters ago, the patient did have a patch over the elbow which she says was remedied by emollients application. She denies photosensitivity or Raynauds.  She further denies visual/ GI disturbances, oral ulcers, dyspnea, chest pain, motor disturbances.

## 2021-06-03 NOTE — CONSULT LETTER
[Dear  ___] : Dear  [unfilled], [Courtesy Letter:] : I had the pleasure of seeing your patient, [unfilled], in my office today. [Please see my note below.] : Please see my note below. [Consult Closing:] : Thank you very much for allowing me to participate in the care of this patient.  If you have any questions, please do not hesitate to contact me. [Sincerely,] : Sincerely, [FreeTextEntry2] : Winsome Garcia MD \par [facility] [FreeTextEntry3] : Yamileth Reveles M.D.\par  of Medicine \par [facility] at [facility]/[place]\par \par

## 2021-06-03 NOTE — PHYSICAL EXAM
[General Appearance - Alert] : alert [General Appearance - In No Acute Distress] : in no acute distress [Sclera] : the sclera and conjunctiva were normal [] : the neck was supple [Auscultation Breath Sounds / Voice Sounds] : lungs were clear to auscultation bilaterally [Edema] : there was no peripheral edema [No Spinal Tenderness] : no spinal tenderness [Nail Clubbing] : no clubbing  or cyanosis of the fingernails [Musculoskeletal - Swelling] : no joint swelling seen [Motor Tone] : muscle strength and tone were normal [Skin Lesions] : no skin lesions [Motor Exam] : the motor exam was normal [Oriented To Time, Place, And Person] : oriented to person, place, and time [Impaired Insight] : insight and judgment were intact [FreeTextEntry1] : Knees with moderate tenderness along the medial joint lines

## 2021-06-03 NOTE — ASSESSMENT
[FreeTextEntry1] : Patient has had long-standing arthralgias, bilateral knee pain secondary to mechanical disease; with progressive hip pain in the setting of HLA-B27 positivity:\par Over the last 10 years,  patient has been noting diffuse arthralgias from the hands, to the lower extremities.  With the HLA-B27 positivity, patient to initiate TNFalpha inhibitor for a spondyloarthropathy presentation, office sample of Adalimumab administered today.  Non-radiographic findings of spondyloarthropathy have been reported. Discussed increased risk of serious infections related to biologic therapy and the importance of regular vaccinations.\par Patient will benefit from physical therapy to help with joint mobility and muscle strengthening.  Quadriceps strengthening exercises demonstrated in the office.  Weight loss has been encouraged to reduce load over the medial joint line.  Viscosupplementation has been encouraged to provide additional lubrication and joint support.  In the interim, Diclofenac gel has been rec.  Core strengthening and exercise modification emphasized.\par She is in agreement with the above plan and will return in one months' time.\par

## 2021-06-03 NOTE — PROCEDURE
[Today's Date:] : Date: [unfilled] [Patient] : the patient [Risks] : risks [Benefits] : benefits [Consent Obtained] : written consent was obtained prior to the procedure and is detailed in the patient's record [Therapeutic] : therapeutic [#1 Site: ______] : #1 site identified in the [unfilled] [Alcohol] : alcohol [Tolerated Well] : the patient tolerated the procedure well [No Complications] : there were no complications [Patient Instructed to Call] : patient was instructed to call if redness at site, a decrease in range of motion or an increase in pain is noted after procedure. [FreeTextEntry5] : Adalimumab 40mg injected

## 2021-06-15 ENCOUNTER — NON-APPOINTMENT (OUTPATIENT)
Age: 51
End: 2021-06-15

## 2021-06-15 ENCOUNTER — APPOINTMENT (OUTPATIENT)
Dept: INTERNAL MEDICINE | Facility: CLINIC | Age: 51
End: 2021-06-15
Payer: COMMERCIAL

## 2021-06-15 VITALS
TEMPERATURE: 98.6 F | HEART RATE: 72 BPM | BODY MASS INDEX: 34.1 KG/M2 | RESPIRATION RATE: 16 BRPM | OXYGEN SATURATION: 99 % | DIASTOLIC BLOOD PRESSURE: 72 MMHG | WEIGHT: 225 LBS | SYSTOLIC BLOOD PRESSURE: 106 MMHG | HEIGHT: 68 IN

## 2021-06-15 PROCEDURE — 99072 ADDL SUPL MATRL&STAF TM PHE: CPT

## 2021-06-15 PROCEDURE — 99213 OFFICE O/P EST LOW 20 MIN: CPT

## 2021-06-15 NOTE — HISTORY OF PRESENT ILLNESS
[FreeTextEntry1] : nasal,throat congestion [de-identified] : 51 yo c/o seasonal nasal congestion,accumulation of phlegm in throat.\par was recently started on Humira inj for arthralgias,osteoarthropathy ass.with HLA B-27,mild sed rate elevation 27,CAITIE 1:80.\par also is on ?Diclofenac for OA's knees.\par recent labs reviewed,A1C wnl,Lipids HDL 48,.TSH wnl.\par vit D was low~20\par h/o COVID19 IGG Ab+ test in [facility] in 04/20,with mild course,no need in CxR.\par pt is on polypharmacy for arthralgias,rosacea,HSV,etc.\par h/o R.thyroid nodule,clinically,biochemically euthyroid,recent TSH wnl.

## 2021-06-15 NOTE — COUNSELING
[Potential consequences of obesity discussed] : Potential consequences of obesity discussed [Decrease Portions] : decrease portions [de-identified] : healthy eating,exercise [None] : None [Good understanding] : Patient has a good understanding of lifestyle changes and steps needed to achieve self management goal

## 2021-06-15 NOTE — PHYSICAL EXAM
[Well Developed] : well developed [Well-Appearing] : well-appearing [Normal Sclera/Conjunctiva] : normal sclera/conjunctiva [PERRL] : pupils equal round and reactive to light [EOMI] : extraocular movements intact [Normal Outer Ear/Nose] : the outer ears and nose were normal in appearance [Normal Oropharynx] : the oropharynx was normal [No JVD] : no jugular venous distention [No Lymphadenopathy] : no lymphadenopathy [Supple] : supple [No Respiratory Distress] : no respiratory distress  [No Accessory Muscle Use] : no accessory muscle use [Clear to Auscultation] : lungs were clear to auscultation bilaterally [Normal Rate] : normal rate  [Regular Rhythm] : with a regular rhythm [Normal S1, S2] : normal S1 and S2 [No Murmur] : no murmur heard [No Carotid Bruits] : no carotid bruits [No Abdominal Bruit] : a ~M bruit was not heard ~T in the abdomen [No Varicosities] : no varicosities [Pedal Pulses Present] : the pedal pulses are present [No Edema] : there was no peripheral edema [Normal Appearance] : normal in appearance [No Nipple Discharge] : no nipple discharge [No Axillary Lymphadenopathy] : no axillary lymphadenopathy [Soft] : abdomen soft [No Masses] : no abdominal mass palpated [No HSM] : no HSM [Normal Posterior Cervical Nodes] : no posterior cervical lymphadenopathy [Normal Anterior Cervical Nodes] : no anterior cervical lymphadenopathy [No CVA Tenderness] : no CVA  tenderness [No Spinal Tenderness] : no spinal tenderness [No Joint Swelling] : no joint swelling [Grossly Normal Strength/Tone] : grossly normal strength/tone [No Rash] : no rash [No Focal Deficits] : no focal deficits [Normal Gait] : normal gait [Normal Affect] : the affect was normal [Normal Insight/Judgement] : insight and judgment were intact [Normal Nasal Mucosa] : the nasal mucosa was normal [Normal Supraclavicular Nodes] : no supraclavicular lymphadenopathy [de-identified] : obese [de-identified] : no distinct thyroid enlargement,nodules appreciated

## 2021-06-15 NOTE — HEALTH RISK ASSESSMENT
[] : No [No] : In the past 12 months have you used drugs other than those required for medical reasons? No [No falls in past year] : Patient reported no falls in the past year [0] : 1) Little interest or pleasure doing things: Not at all (0) [[id number]] : 0

## 2021-06-15 NOTE — ASSESSMENT
[FreeTextEntry1] : 51 yo advised of saline nasal spray XLear,referred to ENT.\par was recently started on TNF inh.for spondyloarthropathy,arthralgias\par will ad  Vit D\par h/o R.thyroid nodule,f/u US,poss.FNA\par referred to GI for evaluation of new onset of IBS like Sx's.\par pt is post COVID 19+ mild course in Apr.2020.\par will add routine labs:CBC,CMP\par mammogram/

## 2021-09-07 ENCOUNTER — APPOINTMENT (OUTPATIENT)
Dept: RHEUMATOLOGY | Facility: CLINIC | Age: 51
End: 2021-09-07
Payer: COMMERCIAL

## 2021-09-07 VITALS
BODY MASS INDEX: 34.1 KG/M2 | RESPIRATION RATE: 16 BRPM | TEMPERATURE: 97.2 F | HEIGHT: 68 IN | DIASTOLIC BLOOD PRESSURE: 80 MMHG | HEART RATE: 79 BPM | SYSTOLIC BLOOD PRESSURE: 114 MMHG | WEIGHT: 225 LBS | OXYGEN SATURATION: 97 %

## 2021-09-07 DIAGNOSIS — M70.62 TROCHANTERIC BURSITIS, RIGHT HIP: ICD-10-CM

## 2021-09-07 DIAGNOSIS — M70.61 TROCHANTERIC BURSITIS, RIGHT HIP: ICD-10-CM

## 2021-09-07 PROCEDURE — 99214 OFFICE O/P EST MOD 30 MIN: CPT

## 2021-09-08 LAB
ALBUMIN SERPL ELPH-MCNC: 4.5 G/DL
ALP BLD-CCNC: 83 U/L
ALT SERPL-CCNC: 15 U/L
ANION GAP SERPL CALC-SCNC: 14 MMOL/L
APPEARANCE: CLEAR
AST SERPL-CCNC: 14 U/L
BASOPHILS # BLD AUTO: 0.04 K/UL
BASOPHILS NFR BLD AUTO: 0.6 %
BILIRUB SERPL-MCNC: 0.8 MG/DL
BILIRUBIN URINE: NEGATIVE
BLOOD URINE: NEGATIVE
BUN SERPL-MCNC: 9 MG/DL
CALCIUM SERPL-MCNC: 9.3 MG/DL
CHLORIDE SERPL-SCNC: 104 MMOL/L
CO2 SERPL-SCNC: 21 MMOL/L
COLOR: COLORLESS
CREAT SERPL-MCNC: 0.62 MG/DL
EOSINOPHIL # BLD AUTO: 0.15 K/UL
EOSINOPHIL NFR BLD AUTO: 2.1 %
GLUCOSE QUALITATIVE U: NEGATIVE
GLUCOSE SERPL-MCNC: 90 MG/DL
HCT VFR BLD CALC: 39.3 %
HGB BLD-MCNC: 12 G/DL
IMM GRANULOCYTES NFR BLD AUTO: 0.1 %
KETONES URINE: NEGATIVE
LEUKOCYTE ESTERASE URINE: NEGATIVE
LYMPHOCYTES # BLD AUTO: 2.73 K/UL
LYMPHOCYTES NFR BLD AUTO: 38.2 %
MAN DIFF?: NORMAL
MCHC RBC-ENTMCNC: 28.8 PG
MCHC RBC-ENTMCNC: 30.5 GM/DL
MCV RBC AUTO: 94.2 FL
MONOCYTES # BLD AUTO: 0.5 K/UL
MONOCYTES NFR BLD AUTO: 7 %
NEUTROPHILS # BLD AUTO: 3.72 K/UL
NEUTROPHILS NFR BLD AUTO: 52 %
NITRITE URINE: NEGATIVE
PH URINE: 6.5
PLATELET # BLD AUTO: 379 K/UL
POTASSIUM SERPL-SCNC: 3.7 MMOL/L
PROT SERPL-MCNC: 7.2 G/DL
PROTEIN URINE: NEGATIVE
RBC # BLD: 4.17 M/UL
RBC # FLD: 14.1 %
SODIUM SERPL-SCNC: 139 MMOL/L
SPECIFIC GRAVITY URINE: 1.01
UROBILINOGEN URINE: NORMAL
WBC # FLD AUTO: 7.15 K/UL

## 2021-09-10 PROBLEM — M70.61 TROCHANTERIC BURSITIS OF BOTH HIPS: Status: ACTIVE | Noted: 2019-05-29

## 2021-09-10 NOTE — HISTORY OF PRESENT ILLNESS
[FreeTextEntry1] : Patient returns for followup after initiating Adalimumab x 3 months and finds mild improvement in hip and lumbar tightness. Hip and spine and hip films reflect DJD changes ; SI joint without erosive sequela. Patient with  testing reflecting HLA-B27 positivity; patient with sedimentation rate of 27 mm/h otherwise normal markers.  Blood testing reflecting normal surveillance labs including QF gold testing and hepatitis testing.  Prior IgG+ C. Trachomatis noted. Patient had a fleeting low titer CCP antibody in the past as well as a low titer CAITIE of 1:80 nucleolar pattern. Many encounters ago, the patient did have a patch over the elbow which she says was remedied by emollients application. She denies photosensitivity or Raynauds.  She further denies visual/ GI disturbances, oral ulcers, dyspnea, chest pain, motor disturbances.

## 2021-09-10 NOTE — ASSESSMENT
[FreeTextEntry1] : Patient has had long-standing arthralgias, bilateral knee pain secondary to mechanical disease; with progressive hip pain in the setting of HLA-B27 positivity:\par \par Patient to c/w TNF-alpha inhibitor for a spondyloarthropathy presentation.  Would like MR imaging of lumbar spine and further asses SI joints.  Discussed increased risk of serious infections related to biologic therapy and the importance of regular vaccinations.\par Patient will benefit from physical therapy to help with joint mobility and muscle strengthening.  Quadriceps strengthening exercises demonstrated in the office.  Weight loss has been encouraged to reduce load over the medial joint line.  Viscosupplementation has been encouraged to provide additional lubrication and joint support.  In the interim, Diclofenac gel has been rec.  Core strengthening and exercise modification emphasized.\par \par She is in agreement with the above plan and will return in two months' time.\par

## 2021-09-10 NOTE — CONSULT LETTER
[Dear  ___] : Dear  [unfilled], [Courtesy Letter:] : I had the pleasure of seeing your patient, [unfilled], in my office today. [Please see my note below.] : Please see my note below. [Consult Closing:] : Thank you very much for allowing me to participate in the care of this patient.  If you have any questions, please do not hesitate to contact me. [Sincerely,] : Sincerely, [FreeTextEntry2] : Winsome Garcia MD  [FreeTextEntry3] : Yamileth Reveles M.D.\par  of Medicine \par [facility] at [facility]/[place]\par \par

## 2021-09-10 NOTE — PHYSICAL EXAM
[General Appearance - Alert] : alert [General Appearance - In No Acute Distress] : in no acute distress [Sclera] : the sclera and conjunctiva were normal [] : the neck was supple [Auscultation Breath Sounds / Voice Sounds] : lungs were clear to auscultation bilaterally [Edema] : there was no peripheral edema [No Spinal Tenderness] : no spinal tenderness [Nail Clubbing] : no clubbing  or cyanosis of the fingernails [Musculoskeletal - Swelling] : no joint swelling seen [Motor Tone] : muscle strength and tone were normal [Skin Lesions] : no skin lesions [Oriented To Time, Place, And Person] : oriented to person, place, and time [Motor Exam] : the motor exam was normal [Impaired Insight] : insight and judgment were intact [FreeTextEntry1] : Knees with moderate tenderness along the medial joint lines

## 2021-09-23 ENCOUNTER — TRANSCRIPTION ENCOUNTER (OUTPATIENT)
Age: 51
End: 2021-09-23

## 2021-10-05 ENCOUNTER — APPOINTMENT (OUTPATIENT)
Dept: RHEUMATOLOGY | Facility: CLINIC | Age: 51
End: 2021-10-05
Payer: COMMERCIAL

## 2021-10-05 VITALS
OXYGEN SATURATION: 98 % | DIASTOLIC BLOOD PRESSURE: 70 MMHG | HEIGHT: 68 IN | WEIGHT: 225 LBS | SYSTOLIC BLOOD PRESSURE: 126 MMHG | BODY MASS INDEX: 34.1 KG/M2 | RESPIRATION RATE: 16 BRPM | TEMPERATURE: 97.2 F | HEART RATE: 70 BPM

## 2021-10-05 PROCEDURE — 20610 DRAIN/INJ JOINT/BURSA W/O US: CPT | Mod: 50

## 2021-10-05 RX ORDER — HYLAN G-F 20 16MG/2ML
16 SYRINGE (ML) INTRAARTICULAR
Qty: 0 | Refills: 0 | Status: COMPLETED | OUTPATIENT
Start: 2021-10-05

## 2021-10-05 RX ADMIN — Medication 0 MG/2ML: at 00:00

## 2021-10-06 NOTE — ASSESSMENT
[FreeTextEntry1] : Patient receives Synvisc series #1 of 3 in the b/l knees:\par \par Patient will benefit from continued physical therapy to help with joint mobility and muscle strengthening.  Encouraged to hydrate and minimal weight bearing for the next 24 hours along with ice application 20 mins on/ 20 mins off.  She is in agreement with the above plan and will return in one weeks' time.\par

## 2021-10-12 ENCOUNTER — APPOINTMENT (OUTPATIENT)
Dept: RHEUMATOLOGY | Facility: CLINIC | Age: 51
End: 2021-10-12
Payer: COMMERCIAL

## 2021-10-12 VITALS
RESPIRATION RATE: 16 BRPM | SYSTOLIC BLOOD PRESSURE: 115 MMHG | HEIGHT: 68 IN | BODY MASS INDEX: 34.1 KG/M2 | HEART RATE: 80 BPM | OXYGEN SATURATION: 100 % | DIASTOLIC BLOOD PRESSURE: 77 MMHG | WEIGHT: 225 LBS | TEMPERATURE: 97.9 F

## 2021-10-12 PROCEDURE — 20610 DRAIN/INJ JOINT/BURSA W/O US: CPT | Mod: 50

## 2021-10-12 RX ORDER — HYLAN G-F 20 16MG/2ML
16 SYRINGE (ML) INTRAARTICULAR
Qty: 0 | Refills: 0 | Status: COMPLETED | OUTPATIENT
Start: 2021-10-12

## 2021-10-12 RX ADMIN — Medication 0 MG/2ML: at 00:00

## 2021-10-13 NOTE — ASSESSMENT
[FreeTextEntry1] : Patient receives Synvisc series #2 of 3 in the b/l knees:\par \par Patient will benefit from continued physical therapy to help with joint mobility and muscle strengthening.  Encouraged to hydrate and minimal weight bearing for the next 24 hours along with ice application 20 mins on/ 20 mins off.  She is in agreement with the above plan and will return in one weeks' time.\par

## 2021-10-19 ENCOUNTER — APPOINTMENT (OUTPATIENT)
Dept: RHEUMATOLOGY | Facility: CLINIC | Age: 51
End: 2021-10-19
Payer: COMMERCIAL

## 2021-10-19 VITALS
DIASTOLIC BLOOD PRESSURE: 73 MMHG | HEART RATE: 77 BPM | BODY MASS INDEX: 34.1 KG/M2 | HEIGHT: 68 IN | RESPIRATION RATE: 16 BRPM | OXYGEN SATURATION: 98 % | TEMPERATURE: 97.8 F | SYSTOLIC BLOOD PRESSURE: 112 MMHG | WEIGHT: 225 LBS

## 2021-10-19 DIAGNOSIS — M25.40 EFFUSION, UNSPECIFIED JOINT: ICD-10-CM

## 2021-10-19 PROCEDURE — 20610 DRAIN/INJ JOINT/BURSA W/O US: CPT | Mod: 50

## 2021-10-19 RX ORDER — HYLAN G-F 20 16MG/2ML
16 SYRINGE (ML) INTRAARTICULAR
Qty: 0 | Refills: 0 | Status: COMPLETED | OUTPATIENT
Start: 2021-10-19

## 2021-10-19 RX ADMIN — Medication 0 MG/2ML: at 00:00

## 2021-10-19 NOTE — ASSESSMENT
[FreeTextEntry1] : Patient receives Synvisc series #3 of 3 in the b/l knees:\par \par Patient will benefit from continued physical therapy to help with joint mobility and muscle strengthening.  Encouraged to hydrate and minimal weight bearing for the next 24 hours along with ice application 20 mins on/ 20 mins off.  She is in agreement with the above plan and will return in three months; time.\par

## 2021-10-21 ENCOUNTER — TRANSCRIPTION ENCOUNTER (OUTPATIENT)
Age: 51
End: 2021-10-21

## 2021-10-21 PROBLEM — M25.40 JOINT SWELLING: Status: ACTIVE | Noted: 2021-10-21

## 2021-10-23 ENCOUNTER — TRANSCRIPTION ENCOUNTER (OUTPATIENT)
Age: 51
End: 2021-10-23

## 2021-10-25 ENCOUNTER — TRANSCRIPTION ENCOUNTER (OUTPATIENT)
Age: 51
End: 2021-10-25

## 2021-12-03 LAB
BASOPHILS # BLD AUTO: 0.03 K/UL
BASOPHILS NFR BLD AUTO: 0.5 %
EOSINOPHIL # BLD AUTO: 0.11 K/UL
EOSINOPHIL NFR BLD AUTO: 2 %
HCT VFR BLD CALC: 35.1 %
HGB BLD-MCNC: 11.1 G/DL
IMM GRANULOCYTES NFR BLD AUTO: 0.4 %
LYMPHOCYTES # BLD AUTO: 1.77 K/UL
LYMPHOCYTES NFR BLD AUTO: 31.6 %
MAN DIFF?: NORMAL
MCHC RBC-ENTMCNC: 28.5 PG
MCHC RBC-ENTMCNC: 31.6 GM/DL
MCV RBC AUTO: 90 FL
MONOCYTES # BLD AUTO: 0.37 K/UL
MONOCYTES NFR BLD AUTO: 6.6 %
NEUTROPHILS # BLD AUTO: 3.31 K/UL
NEUTROPHILS NFR BLD AUTO: 58.9 %
PLATELET # BLD AUTO: 355 K/UL
RBC # BLD: 3.9 M/UL
RBC # FLD: 13.9 %
WBC # FLD AUTO: 5.61 K/UL

## 2021-12-06 LAB
COVID-19 SPIKE DOMAIN ANTIBODY INTERPRETATION: POSITIVE
CRP SERPL-MCNC: <3 MG/L
ERYTHROCYTE [SEDIMENTATION RATE] IN BLOOD BY WESTERGREN METHOD: 32 MM/HR
SARS-COV-2 AB SERPL IA-ACNC: >250 U/ML
TSH SERPL-ACNC: 1.77 UIU/ML

## 2021-12-09 ENCOUNTER — APPOINTMENT (OUTPATIENT)
Dept: RHEUMATOLOGY | Facility: CLINIC | Age: 51
End: 2021-12-09
Payer: COMMERCIAL

## 2021-12-09 VITALS
WEIGHT: 228 LBS | TEMPERATURE: 97.5 F | HEIGHT: 68 IN | OXYGEN SATURATION: 98 % | HEART RATE: 82 BPM | RESPIRATION RATE: 16 BRPM | DIASTOLIC BLOOD PRESSURE: 75 MMHG | BODY MASS INDEX: 34.56 KG/M2 | SYSTOLIC BLOOD PRESSURE: 114 MMHG

## 2021-12-09 PROCEDURE — 99213 OFFICE O/P EST LOW 20 MIN: CPT

## 2021-12-13 NOTE — ASSESSMENT
[FreeTextEntry1] : Patient has had long-standing arthralgias, bilateral knee pain secondary to mechanical disease;  mild improvement hip pain in the setting of HLA-B27 positivity:\par \par Patient to c/w TNF-alpha inhibitor for a spondyloarthropathy presentation.  Discussed increased risk of serious infections related to biologic therapy and the importance of regular vaccinations.\par Patient will benefit from physical therapy to help with joint mobility and muscle strengthening.  Quadriceps strengthening exercises demonstrated in the office.  Weight loss has been encouraged to reduce load over the medial joint line.  Viscosupplementation has been encouraged to provide additional lubrication and joint support.  In the interim, Diclofenac gel has been rec.  Core strengthening and exercise modification emphasized.\par \par She is in agreement with the above plan and will return in two months' time.\par

## 2021-12-13 NOTE — PHYSICAL EXAM
[General Appearance - Alert] : alert [General Appearance - In No Acute Distress] : in no acute distress [Sclera] : the sclera and conjunctiva were normal [] : the neck was supple [Auscultation Breath Sounds / Voice Sounds] : lungs were clear to auscultation bilaterally [Edema] : there was no peripheral edema [No Spinal Tenderness] : no spinal tenderness [Nail Clubbing] : no clubbing  or cyanosis of the fingernails [Musculoskeletal - Swelling] : no joint swelling seen [Motor Tone] : muscle strength and tone were normal [FreeTextEntry1] : Knees with mild tenderness along the medial joint lines [Skin Lesions] : no skin lesions [Motor Exam] : the motor exam was normal [Oriented To Time, Place, And Person] : oriented to person, place, and time [Impaired Insight] : insight and judgment were intact

## 2021-12-13 NOTE — CONSULT LETTER
[Dear  ___] : Dear  [unfilled], [Courtesy Letter:] : I had the pleasure of seeing your patient, [unfilled], in my office today. [Please see my note below.] : Please see my note below. [Consult Closing:] : Thank you very much for allowing me to participate in the care of this patient.  If you have any questions, please do not hesitate to contact me. [Sincerely,] : Sincerely, [FreeTextEntry2] : Winsome Garcia MD  [FreeTextEntry3] : Yamileth Reveles M.D.\par  of Medicine \par [facility] [employer] [facility]/[place]\par \par

## 2021-12-13 NOTE — REVIEW OF SYSTEMS
[Fever] : no fever [Chills] : no chills [Dry Eyes] : no dryness of the eyes [Sore Throat] : no sore throat [Hoarseness] : no hoarseness [Chest Pain] : no chest pain [Palpitations] : no palpitations [Cough] : no cough [SOB on Exertion] : no shortness of breath during exertion [Arthralgias] : arthralgias [Joint Swelling] : no joint swelling [Joint Stiffness] : joint stiffness [Skin Lesions] : no skin lesions [Difficulty Walking] : difficulty walking [Sleep Disturbances] : no sleep disturbances [Feelings Of Weakness] : no feelings of weakness [Easy Bleeding] : a tendency for easy bleeding [Easy Bruising] : no tendency for easy bruising

## 2021-12-13 NOTE — HISTORY OF PRESENT ILLNESS
[FreeTextEntry1] : Patient returns for followup after initiating Adalimumab with mild to moderate improvement in hip and lumbar tightness. Hip and spine and hip films reflect DJD changes ; SI joint without erosive sequela.  MR imaging performed in October 2021 reflects mild multilevel annular bulge/ osteophyte formation most notable at L4-5 and L5-S1 without foraminal stenosis with endplate marrow signal changes at L4-5.  Patient with  testing reflecting HLA-B27 positivity; patient with sedimentation rate of 27 mm/h otherwise normal markers.  Blood testing reflecting normal surveillance labs including QF gold testing and hepatitis testing.  Prior IgG+ C. Trachomatis noted. Patient had a fleeting low titer CCP antibody in the past as well as a low titer CAITIE of 1:80 nucleolar pattern. Many encounters ago, the patient had a psoriatic patch over the elbow which she notes was remedied by emollients application.\par \par She denies photosensitivity or Raynauds.  She further denies visual/ GI disturbances, oral ulcers, dyspnea, chest pain, motor disturbances.

## 2022-01-13 ENCOUNTER — TRANSCRIPTION ENCOUNTER (OUTPATIENT)
Age: 52
End: 2022-01-13

## 2022-01-18 ENCOUNTER — TRANSCRIPTION ENCOUNTER (OUTPATIENT)
Age: 52
End: 2022-01-18

## 2022-02-03 ENCOUNTER — NON-APPOINTMENT (OUTPATIENT)
Age: 52
End: 2022-02-03

## 2022-04-08 ENCOUNTER — APPOINTMENT (OUTPATIENT)
Dept: VASCULAR SURGERY | Facility: CLINIC | Age: 52
End: 2022-04-08
Payer: COMMERCIAL

## 2022-04-08 VITALS
BODY MASS INDEX: 34.86 KG/M2 | WEIGHT: 230 LBS | HEIGHT: 68 IN | OXYGEN SATURATION: 99 % | HEART RATE: 74 BPM | SYSTOLIC BLOOD PRESSURE: 132 MMHG | DIASTOLIC BLOOD PRESSURE: 88 MMHG

## 2022-04-08 PROCEDURE — 99204 OFFICE O/P NEW MOD 45 MIN: CPT

## 2022-04-08 NOTE — PHYSICAL EXAM
[JVD] : no jugular venous distention  [Normal Breath Sounds] : Normal breath sounds [Normal Heart Sounds] : normal heart sounds [2+] : left 2+ [Varicose Veins Of Lower Extremities] : bilaterally [Ankle Swelling On The Left] : moderate [] : not present [Abdomen Masses] : No abdominal masses [Skin Ulcer] : no ulcer

## 2022-04-08 NOTE — CONSULT LETTER
[Dear  ___] : Dear  [unfilled], [Consult Letter:] : I had the pleasure of evaluating your patient, [unfilled]. [Please see my note below.] : Please see my note below. [Consult Closing:] : Thank you very much for allowing me to participate in the care of this patient.  If you have any questions, please do not hesitate to contact me. [Sincerely,] : Sincerely, [FreeTextEntry3] : Eric Darnell M.D., F.GENES., R.P.ANGELINA.I.\par  of Vascular Surgery\par Assistant Professor of Radiology\par Director of Endovascular Program/ Vascular Access Center\par [facility]

## 2022-04-08 NOTE — ASSESSMENT
[FreeTextEntry1] : Patient with bilateral lower extremity varicosities and venous insufficiency.  No evidence of arterial insufficiency.\par \par Recommend compression stockings elevation.  We will arrange for the patient to undergo venous duplex on the return visit.\par \par

## 2022-04-08 NOTE — HISTORY OF PRESENT ILLNESS
[FreeTextEntry1] : Patient is a 51-year-old female with past medical history significant for severe rheumatoid arthritis being treated by the rheumatology service for generalized arthritis and pain presenting to us for evaluation of varicosities of bilateral lower extremity, right worse than left.  There is a specific area on the right lateral knee area which enlarged varicosities and this is the area that is causing the most pain.  No rest pain or claudication symptoms.  No history of bleeding.  No history of DVT.  No history of diabetes.  No history of coronary artery disease.  No history of smoking.\par \par

## 2022-05-11 ENCOUNTER — TRANSCRIPTION ENCOUNTER (OUTPATIENT)
Age: 52
End: 2022-05-11

## 2022-05-11 DIAGNOSIS — Z51.81 ENCOUNTER FOR THERAPEUTIC DRUG LVL MONITORING: ICD-10-CM

## 2022-05-13 ENCOUNTER — APPOINTMENT (OUTPATIENT)
Dept: VASCULAR SURGERY | Facility: CLINIC | Age: 52
End: 2022-05-13

## 2022-05-20 ENCOUNTER — APPOINTMENT (OUTPATIENT)
Dept: VASCULAR SURGERY | Facility: CLINIC | Age: 52
End: 2022-05-20
Payer: COMMERCIAL

## 2022-05-20 VITALS
DIASTOLIC BLOOD PRESSURE: 74 MMHG | HEART RATE: 71 BPM | WEIGHT: 230 LBS | BODY MASS INDEX: 34.86 KG/M2 | SYSTOLIC BLOOD PRESSURE: 137 MMHG | HEIGHT: 68 IN

## 2022-05-20 DIAGNOSIS — I87.2 VENOUS INSUFFICIENCY (CHRONIC) (PERIPHERAL): ICD-10-CM

## 2022-05-20 PROCEDURE — 93970 EXTREMITY STUDY: CPT

## 2022-05-20 PROCEDURE — 99212 OFFICE O/P EST SF 10 MIN: CPT

## 2022-05-20 NOTE — ASSESSMENT
[FreeTextEntry1] : 50 yo female with pmhx of RA presents for follow up of lower extremity varicose veins and pain s/p venous duplex. \par \par venous duplex shows no evidence of dvt/svt, right lower extremity shows varicose veins without any underlying insufficiency and notable bakers cyst, left lower extremity with reflux in the gsv in the calf only \par \par at this time would recommend conservative management with compression stockings, elevation, exercise and weight loss

## 2022-05-20 NOTE — PHYSICAL EXAM
[JVD] : no jugular venous distention  [Ankle Swelling (On Exam)] : not present [Alert] : alert [Calm] : calm [de-identified] : appears well

## 2022-05-20 NOTE — HISTORY OF PRESENT ILLNESS
[FreeTextEntry1] : 50 yo female with pmhx of RA presents for follow up of lower extremity varicose veins and pain s/p venous duplex.  pt states that she wears athletic compression socks that are thigh high for some compression.  pt otherwise denies any changes in her symptoms or history

## 2022-05-22 DIAGNOSIS — E55.9 VITAMIN D DEFICIENCY, UNSPECIFIED: ICD-10-CM

## 2022-05-22 LAB
25(OH)D3 SERPL-MCNC: 19.1 NG/ML
ALBUMIN SERPL ELPH-MCNC: 4.3 G/DL
ALP BLD-CCNC: 88 U/L
ALT SERPL-CCNC: 25 U/L
ANION GAP SERPL CALC-SCNC: 11 MMOL/L
APPEARANCE: CLEAR
AST SERPL-CCNC: 21 U/L
BASOPHILS # BLD AUTO: 0.04 K/UL
BASOPHILS NFR BLD AUTO: 0.6 %
BILIRUB SERPL-MCNC: 0.7 MG/DL
BILIRUBIN URINE: NEGATIVE
BLOOD URINE: NEGATIVE
BUN SERPL-MCNC: 7 MG/DL
CALCIUM SERPL-MCNC: 9.5 MG/DL
CHLORIDE SERPL-SCNC: 104 MMOL/L
CO2 SERPL-SCNC: 24 MMOL/L
COLOR: NORMAL
CREAT SERPL-MCNC: 0.62 MG/DL
CRP SERPL-MCNC: <3 MG/L
EGFR: 109 ML/MIN/1.73M2
EOSINOPHIL # BLD AUTO: 0.07 K/UL
EOSINOPHIL NFR BLD AUTO: 1.1 %
ERYTHROCYTE [SEDIMENTATION RATE] IN BLOOD BY WESTERGREN METHOD: 17 MM/HR
ESTIMATED AVERAGE GLUCOSE: 111 MG/DL
GLUCOSE QUALITATIVE U: NEGATIVE
GLUCOSE SERPL-MCNC: 88 MG/DL
HBA1C MFR BLD HPLC: 5.5 %
HCT VFR BLD CALC: 38 %
HGB BLD-MCNC: 12.1 G/DL
IMM GRANULOCYTES NFR BLD AUTO: 0.2 %
KETONES URINE: NORMAL
LEUKOCYTE ESTERASE URINE: NEGATIVE
LYMPHOCYTES # BLD AUTO: 1.5 K/UL
LYMPHOCYTES NFR BLD AUTO: 22.6 %
MAN DIFF?: NORMAL
MCHC RBC-ENTMCNC: 28.3 PG
MCHC RBC-ENTMCNC: 31.8 GM/DL
MCV RBC AUTO: 89 FL
MONOCYTES # BLD AUTO: 0.44 K/UL
MONOCYTES NFR BLD AUTO: 6.6 %
NEUTROPHILS # BLD AUTO: 4.58 K/UL
NEUTROPHILS NFR BLD AUTO: 68.9 %
NITRITE URINE: NEGATIVE
PH URINE: 6
PLATELET # BLD AUTO: 362 K/UL
POTASSIUM SERPL-SCNC: 4.4 MMOL/L
PROT SERPL-MCNC: 7.2 G/DL
PROTEIN URINE: NEGATIVE
RBC # BLD: 4.27 M/UL
RBC # FLD: 14.3 %
SODIUM SERPL-SCNC: 139 MMOL/L
SPECIFIC GRAVITY URINE: 1.01
TSH SERPL-ACNC: 0.98 UIU/ML
UROBILINOGEN URINE: NORMAL
WBC # FLD AUTO: 6.64 K/UL

## 2022-05-23 LAB
M TB IFN-G BLD-IMP: NEGATIVE
QUANTIFERON TB PLUS MITOGEN MINUS NIL: 5.37 IU/ML
QUANTIFERON TB PLUS NIL: 0 IU/ML
QUANTIFERON TB PLUS TB1 MINUS NIL: 0 IU/ML
QUANTIFERON TB PLUS TB2 MINUS NIL: 0 IU/ML

## 2022-05-31 ENCOUNTER — APPOINTMENT (OUTPATIENT)
Dept: RHEUMATOLOGY | Facility: CLINIC | Age: 52
End: 2022-05-31
Payer: COMMERCIAL

## 2022-05-31 ENCOUNTER — APPOINTMENT (OUTPATIENT)
Dept: INTERNAL MEDICINE | Facility: CLINIC | Age: 52
End: 2022-05-31
Payer: COMMERCIAL

## 2022-05-31 VITALS
WEIGHT: 232 LBS | HEART RATE: 81 BPM | HEIGHT: 68 IN | SYSTOLIC BLOOD PRESSURE: 126 MMHG | DIASTOLIC BLOOD PRESSURE: 84 MMHG | TEMPERATURE: 98 F | RESPIRATION RATE: 16 BRPM | BODY MASS INDEX: 35.16 KG/M2 | OXYGEN SATURATION: 98 %

## 2022-05-31 VITALS
OXYGEN SATURATION: 98 % | RESPIRATION RATE: 16 BRPM | HEIGHT: 68 IN | SYSTOLIC BLOOD PRESSURE: 126 MMHG | DIASTOLIC BLOOD PRESSURE: 84 MMHG | TEMPERATURE: 98 F | HEART RATE: 81 BPM | BODY MASS INDEX: 35.16 KG/M2 | WEIGHT: 232 LBS

## 2022-05-31 DIAGNOSIS — A60.00 HERPESVIRAL INFECTION OF UROGENITAL SYSTEM, UNSPECIFIED: ICD-10-CM

## 2022-05-31 PROCEDURE — 99214 OFFICE O/P EST MOD 30 MIN: CPT

## 2022-05-31 PROCEDURE — 99396 PREV VISIT EST AGE 40-64: CPT

## 2022-05-31 PROCEDURE — 99386 PREV VISIT NEW AGE 40-64: CPT

## 2022-05-31 NOTE — ASSESSMENT
[FreeTextEntry1] : 50 yo advised of saline nasal spray XLear,referred to ENT.\par on TNF inh.for spondyloarthropathy,arthralgias\par h/o R.thyroid nodule,f/u US,poss.FNA\par

## 2022-05-31 NOTE — HISTORY OF PRESENT ILLNESS
[FreeTextEntry1] : ear itching,nasal,throat congestion [de-identified] : 50 yo perimenopausal F.\par  c/o seasonal nasal congestion,accumulation of phlegm in throat,ear itching\par  on Humira inj for arthralgias,osteoarthropathy ass.with HLA B-27,mild sed rate elevation 27,now came down to 17,CAITIE 1:80.\par on polypharmacy for OA's knees,back pain,Rosacea,etc..\par recent labs reviewed,A1C,TSH,CBC,CMP wnl.\par vit D was low~19,pt will walk in am,pm sun.will repeat in Oct/Nov,may need supplement\par h/o COVID19  mild course,2020.\par h/o HSV,genital,on Valacyclovir maintenance.\par pt is on polypharmacy for arthralgias,rosacea,HSV,etc.\par h/o R.thyroid nodule,clinically,biochemically euthyroid,recent TSH wnl.will ask for us thyroid f/u

## 2022-05-31 NOTE — COUNSELING
[Potential consequences of obesity discussed] : Potential consequences of obesity discussed [Benefits of weight loss discussed] : Benefits of weight loss discussed [Decrease Portions] : decrease portions [de-identified] : healthy eating,walking [None] : None [Good understanding] : Patient has a good understanding of lifestyle changes and steps needed to achieve self management goal

## 2022-05-31 NOTE — PHYSICAL EXAM
[Well Developed] : well developed [Well-Appearing] : well-appearing [Normal Sclera/Conjunctiva] : normal sclera/conjunctiva [PERRL] : pupils equal round and reactive to light [EOMI] : extraocular movements intact [Normal Outer Ear/Nose] : the outer ears and nose were normal in appearance [Normal Oropharynx] : the oropharynx was normal [Normal Nasal Mucosa] : the nasal mucosa was normal [No JVD] : no jugular venous distention [No Lymphadenopathy] : no lymphadenopathy [Supple] : supple [No Respiratory Distress] : no respiratory distress  [No Accessory Muscle Use] : no accessory muscle use [Clear to Auscultation] : lungs were clear to auscultation bilaterally [Normal Rate] : normal rate  [Regular Rhythm] : with a regular rhythm [Normal S1, S2] : normal S1 and S2 [No Murmur] : no murmur heard [No Carotid Bruits] : no carotid bruits [No Abdominal Bruit] : a ~M bruit was not heard ~T in the abdomen [No Varicosities] : no varicosities [Pedal Pulses Present] : the pedal pulses are present [No Edema] : there was no peripheral edema [Normal Appearance] : normal in appearance [No Nipple Discharge] : no nipple discharge [No Axillary Lymphadenopathy] : no axillary lymphadenopathy [Soft] : abdomen soft [No Masses] : no abdominal mass palpated [No HSM] : no HSM [Normal Supraclavicular Nodes] : no supraclavicular lymphadenopathy [Normal Posterior Cervical Nodes] : no posterior cervical lymphadenopathy [Normal Anterior Cervical Nodes] : no anterior cervical lymphadenopathy [No CVA Tenderness] : no CVA  tenderness [No Spinal Tenderness] : no spinal tenderness [No Joint Swelling] : no joint swelling [Grossly Normal Strength/Tone] : grossly normal strength/tone [No Rash] : no rash [No Focal Deficits] : no focal deficits [Normal Gait] : normal gait [Normal Affect] : the affect was normal [Normal Insight/Judgement] : insight and judgment were intact [de-identified] : obese [de-identified] : no distinct thyroid enlargement,nodules appreciated

## 2022-05-31 NOTE — HEALTH RISK ASSESSMENT
[Good] : ~his/her~  mood as  good [Never] : Never [No] : In the past 12 months have you used drugs other than those required for medical reasons? No [No falls in past year] : Patient reported no falls in the past year [0] : 2) Feeling down, depressed, or hopeless: Not at all (0) [[id number]] : 0

## 2022-06-01 NOTE — ASSESSMENT
[FreeTextEntry1] : Patient has had long-standing arthralgias, bilateral knee pain secondary to mechanical disease;  moderate improvement hip pain in the setting of HLA-B27 positivity:\par \par Patient to c/w TNF-alpha inhibitor for a spondyloarthropathy presentation.  Discussed increased risk of serious infections related to biologic therapy and the importance of regular vaccinations.\par Patient will benefit from physical therapy to help with joint mobility and muscle strengthening; requisition given.  Quadriceps strengthening exercises demonstrated in the office.  Weight loss has been encouraged to reduce load over the medial joint line.  Viscosupplementation has been encouraged to provide additional lubrication and joint support.  Recommend orthopedic guidance for additional intervention.  In the interim, Diclofenac gel has been rec.  Core strengthening and exercise modification emphasized.\par \par She is in agreement with the above plan and will return in two months' time.\par

## 2022-06-01 NOTE — HISTORY OF PRESENT ILLNESS
[FreeTextEntry1] : Patient returns for followup after initiating Adalimumab with  moderate improvement in hip and lumbar tightness, by 60%,  save last week when she moved to a new workplace , increasing LE mobility.   Patient recently seen by vascular colleagues, consult appreciated and recommended to wear compression stockings for varicosities.\par \par Hip and spine and hip films reflect DJD changes ; SI joint without erosive sequela.  MR imaging performed in October 2021 reflects mild multilevel annular bulge/ osteophyte formation most notable at L4-5 and L5-S1 without foraminal stenosis with endplate marrow signal changes at L4-5.  Patient with  testing reflecting HLA-B27 positivity; patient with improved sedimentation rate of 17 mm/h otherwise normal markers.  \par Blood testing reflecting normal surveillance labs including QF gold testing and hepatitis testing.  Prior IgG+ C. Trachomatis noted. Patient had a fleeting low titer CCP antibody in the past as well as a low titer CAITIE of 1:80 nucleolar pattern. Many encounters ago, the patient had a psoriatic patch over the elbow which she notes was remedied by emollients application.\par \par She denies photosensitivity or Raynauds.  She further denies visual/ GI disturbances, oral ulcers, dyspnea, chest pain, motor disturbances.

## 2022-06-01 NOTE — CONSULT LETTER
[Dear  ___] : Dear  [unfilled], [Courtesy Letter:] : I had the pleasure of seeing your patient, [unfilled], in my office today. [Please see my note below.] : Please see my note below. [Consult Closing:] : Thank you very much for allowing me to participate in the care of this patient.  If you have any questions, please do not hesitate to contact me. [Sincerely,] : Sincerely, [FreeTextEntry2] : Winsome Garcia MD  [FreeTextEntry3] : Yamileth Reveles M.D.\par  of Medicine \par [facility] [employer] [facility] at [facility]/[place]\par \par

## 2022-06-01 NOTE — PHYSICAL EXAM
[General Appearance - Alert] : alert [General Appearance - In No Acute Distress] : in no acute distress [Sclera] : the sclera and conjunctiva were normal [] : the neck was supple [Auscultation Breath Sounds / Voice Sounds] : lungs were clear to auscultation bilaterally [Edema] : there was no peripheral edema [No Spinal Tenderness] : no spinal tenderness [Nail Clubbing] : no clubbing  or cyanosis of the fingernails [Musculoskeletal - Swelling] : no joint swelling seen [Motor Tone] : muscle strength and tone were normal [Skin Lesions] : no skin lesions [Motor Exam] : the motor exam was normal [Oriented To Time, Place, And Person] : oriented to person, place, and time [Impaired Insight] : insight and judgment were intact [FreeTextEntry1] : Knees with mild tenderness along the medial joint lines, RT >LT

## 2022-06-03 ENCOUNTER — APPOINTMENT (OUTPATIENT)
Dept: ORTHOPEDIC SURGERY | Facility: CLINIC | Age: 52
End: 2022-06-03
Payer: COMMERCIAL

## 2022-06-03 VITALS — BODY MASS INDEX: 35.16 KG/M2 | WEIGHT: 232 LBS | HEIGHT: 68 IN

## 2022-06-03 PROCEDURE — 73564 X-RAY EXAM KNEE 4 OR MORE: CPT | Mod: 50

## 2022-06-03 PROCEDURE — 99204 OFFICE O/P NEW MOD 45 MIN: CPT

## 2022-06-03 NOTE — HISTORY OF PRESENT ILLNESS
[de-identified] : CC Bilateral knee\par \par HPI 51-year-old female presents with acute on chronic 3 months on 15 years of activity related anterior and medial pain in Bilateral knee without injury. The pain is worse #10 right greater than left out of 10, without radiation. \par Rest makes the pain better and moving knee makes the pain worse.\par The patient reports associated symptoms of popping clicking swelling right knee. \par The patient + similar pain previously but not nearly as severe with recent worsening after swelling after Synvisc injection.\par \par The patient has tried the following treatments:\par Activity modification	+\par Ice/Compression  	+\par Braces    		+\par Nsaids    		+\par Physical Therapy 	+ 6 months of home exercises\par Cortisone Injection	unable to get due to interaction with medications\par Visco Injection		+ every 6 months with good result until recently with severe pain and swelling\par Zilretta			[-]\par Arthroscopy		[-]\par \par Review of Systems is positive for the above musculoskeletal symptoms and is otherwise non-contributory for general, constitutional, psychiatric, neurologic, HEENT, cardiac, respiratory, gastrointestinal, reproductive, lymphatic, and dermatologic complaints.\par \par Consult by Dr Yamilteh Reveles\par \par Physical Examination\par General: well nourished, in no acute distress, alert and oriented to person, place and time\par Psychiatric: normal mood and affect, no abnormal movements or speech patterns\par Eyes: vision intact [Without] glasses\par \par \par Musculoskeletal Examination\par Ambulation	+ antalgic gait, [-] assistive devices\par \par Knee			Right			Left\par General\par      Swelling/Deformity	normal			normal	\par      Skin			normal			normal\par      Erythema		-			-\par      Standing Alignment	neutral			neutral\par      Effusion		small			none\par Range of Motion\par      Hip			full painless ROM		full painless ROM\par      Knee Flexion		120			120\par      Knee Extension	0			0\par Patella\par      J Sign		-			-\par      Quad Medial/Lateral	1/1 1/1\par      Apprehension		-			-\par      Blaze's		+			+\par      Grind Sign		+			+\par      Crepitus		+			=\par Palpation\par      Medial Joint Line	+			-\par      Medial Fem Condyle Artic	-			-\par      Medial Fem Condyle Epic	-			-\par      Lateral Joint Line	-			-\par      Quad Tendon		-			-\par      Patella Tendon	-			-\par      Medial Patella		-			-\par      Lateral Patella 	+			+\par      Posterior Knee	-			-\par Ligamentous\par      Varus @ 0° / 30°	-/-			-/-\par      Valgus @ 0° / 30°	-/-			-/-\par      Lachman		-			-\par      Pivot Shift		-			-\par      Anterior Drawer	-			-\par      Posterior Drawer	-			-\par Meniscus\par      Nelsy		+ medial pain			-\par      Flexion Pinch		-			-\par Strength Examination/Atrophy\par      Hip Flexors 		5+			5+\par      Quadriceps		5+			5+\par      Hamstring		5+			5+\par      Tibialis Anterior	5+			5+\par      Achilles/Soleus	5+			5+\par Sensation\par      Deep Peroneal	normal			normal\par      Superficial Peroneal 	normal			normal\par      Sural  		normal			normal\par      Posterior Tibial 	normal			normal\par      Saphneous 		normal			normal\par Pulses\par      DP			2+			2+\par \par 4 views of the bilateral knee (standing AP, flexing standing AP, 30degree flexed lateral,  sunrise view)\par were ordered, obtained and evaluated by myself today and\par demonstrate:\par Right\par There is mild medial narrowing\par Small osteophytic lipping\par Small to moderate suprapatellar effusion\par Severe lateral patellofemoral joint space loss [without] evidence of tilt [or] subluxation on sunrise view\par Normal soft tissue density\par Otherwise normal osseous bone structure without fracture or dislocation\par \par Left\par There is trace medial narrowing\par Trace to small osteophytic lipping\par Trace suprapatellar effusion\par Moderate lateral patellofemoral joint space loss [without] evidence of tilt [or] subluxation on sunrise view\par Normal soft tissue density\par Otherwise normal osseous bone structure without fracture or dislocation\par \par \par Bilateral knee patellofemoral osteoarthritis\par Right knee internal derangement suspect medial meniscus\par \par I discussed my findings on history, exam and radiology.\par \par I reviewed the anatomy and function of the periarticular muscles and tendons, patella, ligaments, menisci and cartilage of the knee using models, images and diagrams. Given the current findings for the patient, I recommend proceeding with advanced imaging to better characterize and diagnose the problem to aid patient care, management and treatment guidance as I suspect an internal injury to the knee not diagnosed on non-diagnostic plain radiographs causing the patients symptoms and physical examination to help provide surgical management planning.\par \par Therefore given the patients continued symptoms despite [ non-operative management ] with continued pain affecting ADLs and inability to do activities limiting quality of life as well as locking and instability significant for patient falls potentially placing the patient at increased risk for exacerbating the injury or causing further injury to the knee, an examination and history consistent with injury to an internal knee derangement and a non-diagnostic radiograph I recommend obtaining an MRI to assess the knee's internal structures for preoperative planning. I discussed with the patient that I am ordering an MRI to assess the soft tissue structures in the joint such as ligaments and tendons, as well as to assess the cartilage and menisci as well as for any bony edema. The test will need insurance approval and will take place at a Health system facility or outside facility. If the patient elects to obtain the MRI from an outside facility, the patient understands they have been instructed to bring in both the final radiologist read and a CD/DVD with the MRI images to allow review of the imaging test by myself during the follow up visit. I do not recommend obtaining an open standing MRI as the quality of the images is subpar and makes it difficult to diagnose intra-articular derangements and guide care discussion and decision making.\par The patient was prescribed Diclofenac PO non-steroidal anti-inflammatory medication. 50mg tablets twice daily to be taken for at least 1-2 weeks in a row and then PRN afterwards. Risks and benefits were discussed and include but not limited to renal damage and GI ulceration and bleeding.  They were advised to take with food to limit stomach upset as well as warned to stop the medication if worsening gastric pain or dizziness or other side effects. Also to immediately stop the medication and seek appropriate medical attention if any severe stomach ache, gastritis, black/red vomit, black/red stools or any other medical concern.\par \par The patient verifies their understanding the the visit, diagnosis and plan. They agree with the treatment plan and will contact the office with any questions or problems.\par \par FU after MRI is obtained

## 2022-06-13 ENCOUNTER — RX RENEWAL (OUTPATIENT)
Age: 52
End: 2022-06-13

## 2022-06-22 ENCOUNTER — APPOINTMENT (OUTPATIENT)
Dept: ORTHOPEDIC SURGERY | Facility: CLINIC | Age: 52
End: 2022-06-22
Payer: COMMERCIAL

## 2022-06-22 VITALS — TEMPERATURE: 97.1 F

## 2022-06-22 DIAGNOSIS — S83.241A OTHER TEAR OF MEDIAL MENISCUS, CURRENT INJURY, RIGHT KNEE, INITIAL ENCOUNTER: ICD-10-CM

## 2022-06-22 PROCEDURE — 99213 OFFICE O/P EST LOW 20 MIN: CPT

## 2022-06-22 NOTE — HISTORY OF PRESENT ILLNESS
[de-identified] : CC Bilateral knee\par \par HPI 51-year-old female presents for mri fu acute on chronic 3 months on 15 years of activity related anterior and medial pain in Bilateral knee without injury. The pain is worse #10 right greater than left out of 10, without radiation. \par Rest makes the pain better and moving knee makes the pain worse.\par The patient reports associated symptoms of popping clicking swelling right knee. \par The patient + similar pain previously but not nearly as severe with recent worsening after swelling after Synvisc injection.\par \par The patient has tried the following treatments:\par Activity modification	+\par Ice/Compression  	+\par Braces    		+\par Nsaids    		+\par Physical Therapy 	+ 6 months of home exercises\par Cortisone Injection	unable to get due to interaction with medications\par Visco Injection		+ every 6 months with good result until recently with severe pain and swelling\par Zilretta			[-]\par Arthroscopy		[-]\par \par Review of Systems is positive for the above musculoskeletal symptoms and is otherwise non-contributory for general, constitutional, psychiatric, neurologic, HEENT, cardiac, respiratory, gastrointestinal, reproductive, lymphatic, and dermatologic complaints.\par \par Consult by Dr Yamileth Reveles\par \par Physical Examination\par General: well nourished, in no acute distress, alert and oriented to person, place and time\par Psychiatric: normal mood and affect, no abnormal movements or speech patterns\par Eyes: vision intact [Without] glasses\par \par \par Musculoskeletal Examination\par Ambulation	+ antalgic gait, [-] assistive devices\par \par Knee			Right			Left\par General\par      Swelling/Deformity	normal			normal	\par      Skin			normal			normal\par      Erythema		-			-\par      Standing Alignment	neutral			neutral\par      Effusion		small			none\par Range of Motion\par      Hip			full painless ROM		full painless ROM\par      Knee Flexion		120			120\par      Knee Extension	0			0\par Patella\par      J Sign		-			-\par      Quad Medial/Lateral	1/1 1/1\par      Apprehension		-			-\par      Blaze's		+			+\par      Grind Sign		+			+\par      Crepitus		+			=\par Palpation\par      Medial Joint Line	+			-\par      Medial Fem Condyle Artic	-			-\par      Medial Fem Condyle Epic	-			-\par      Lateral Joint Line	-			-\par      Quad Tendon		-			-\par      Patella Tendon	-			-\par      Medial Patella		-			-\par      Lateral Patella 	+			+\par      Posterior Knee	-			-\par Ligamentous\par      Varus @ 0° / 30°	-/-			-/-\par      Valgus @ 0° / 30°	-/-			-/-\par      Lachman		-			-\par      Pivot Shift		-			-\par      Anterior Drawer	-			-\par      Posterior Drawer	-			-\par Meniscus\par      Nelsy		+ medial pain			-\par      Flexion Pinch		-			-\par Strength Examination/Atrophy\par      Hip Flexors 		5+			5+\par      Quadriceps		5+			5+\par      Hamstring		5+			5+\par      Tibialis Anterior	5+			5+\par      Achilles/Soleus	5+			5+\par Sensation\par      Deep Peroneal	normal			normal\par      Superficial Peroneal 	normal			normal\par      Sural  		normal			normal\par      Posterior Tibial 	normal			normal\par      Saphneous 		normal			normal\par Pulses\par      DP			2+			2+\par \par 4 views of the bilateral knee (standing AP, flexing standing AP, 30degree flexed lateral,  sunrise view)\par 6-3-22\par demonstrate:\par Right\par There is mild medial narrowing\par Small osteophytic lipping\par Small to moderate suprapatellar effusion\par Severe lateral patellofemoral joint space loss [without] evidence of tilt [or] subluxation on sunrise view\par Normal soft tissue density\par Otherwise normal osseous bone structure without fracture or dislocation\par \par Left\par There is trace medial narrowing\par Trace to small osteophytic lipping\par Trace suprapatellar effusion\par Moderate lateral patellofemoral joint space loss [without] evidence of tilt [or] subluxation on sunrise view\par Normal soft tissue density\par Otherwise normal osseous bone structure without fracture or dislocation\par \par \par MRI Left knee from [facility] on 6/\par My impression of the images:\par Quality of the MRI is good\par Medial Meniscus small free edge radial tear posterior horn mild extrusion of the body\par Lateral Meniscus ok\par There is severe patellofemoral chondral loss and focal weightbearing medial femoral condyle chondral loss\par There is [Not] bone marrow edema[/subchondral cysts] in the tri compartments\par LCL [Is] intact\par MCL [Is] intact\par ACL [Is] intact\par PCL [Is] intact \par Quadriceps Tendon [Is] intact\par Patella Tendon [Is] intact\par \par The Final Radiologist Impression:\par Osseous structures: \par No fracture or osteonecrosis.\par \par Cruciate ligaments:\par Mild mucoid degenerative change of the anterior cruciate ligament with intraosseous ganglion cyst formation at the distal attachment on the tibia.\par Intact posterior cruciate ligament (PCL).\par \par Collateral ligaments:\par Intact medial collateral ligament (MCL).\par Intact lateral collateral ligamentous complex (biceps femoris tendon, fibular collateral ligament, and iliotibial band).\par No posteromedial or posterolateral corner injury.\par \par Menisci: \par There is a radial tear of the medial meniscus at the junction of the posterior horn and root ligament with 0.3 cm peripheral extrusion the body segment. Background intrasubstance degeneration noted in the body and posterior horn the medial meniscus.\par There is mild intrasubstance degeneration in the anterior horn of lateral meniscus. No lateral meniscus tears are seen.\par \par Articular cartilage: \par Moderate degenerative chondral thinning over the mid to posterior weightbearing surface of the medial femoral condyle. There is a full-thickness fissure and 0.5 cm chondral flap at the posterior weightbearing surface of the medial femoral condyle, (series 12, image 19).\par Normal articular cartilage within lateral compartment without chondral thinning or defect.\par High-grade cartilage loss seen of the lateral patellar facet and trochlea with associated subchondral edema and cystic change.\par \par Extensor mechanism: \par Normal visualized distal quadriceps tendon without tendinosis, peritendinitis, or tear.\par Normal patellar tendon without tendinosis, peritendinitis, or tear.\par Hypoplastic medial trochlear facet and flattening the femoral trochlear groove in keeping with trochlear dysplasia. There is a prominent medial supratrochlear spur. \par The TT-TG (tibial tubercle to trochlear groove) distance is borderline increased measuring 1.8  cm (borderline: 1.5-2.0 cm). The patellar tendon to patellar ratio is within normal limits measuring 1.4, (normal Insall-Salvati index: 0.74-1.5). \par \par Knee joint:\par A small joint effusion is present.\par A small popliteal cyst is present. 3.5 cm popliteal cyst is seen.\par No visualized intra-articular bodies.\par \par Muscles and nerves: \par No intramuscular edema or atrophy. \par The included sciatic, tibial, and common peroneal nerves are unremarkable.\par \par Proximal tibiofibular joint:\par Normal\par \par Other soft tissues:\par Normal\par \par IMPRESSION:  MRI of the right knee demonstrates: \par \par 1.  Radial tear of the medial meniscus at the junction of the posterior horn and root ligament. Mild peripheral extrusion the body segment.\par 2.  Moderate degenerative cartilage loss with focal full-thickness fissure and 0.5 cm chondral flap at the posterior weightbearing surface of the medial femoral condyle.\par 3.  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  High-grade cartilage loss in the patellofemoral compartment.\par 5.  Small joint effusion and popliteal cyst.\par \par \par Bilateral knee patellofemoral osteoarthritis\par Right knee free edge posterior horn medial meniscus tear\par \par \par MRI Left knee from [facility] on 6/\par My impression of the images:\par Quality of the MRI is good\par Medial Meniscus small free edge radial tear posterior horn mild extrusion of the body\par Lateral Meniscus ok\par There is severe patellofemoral chondral loss and focal weightbearing medial femoral condyle chondral loss\par There is [Not] bone marrow edema[/subchondral cysts] in the tri compartments\par LCL [Is] intact\par MCL [Is] intact\par ACL [Is] intact\par PCL [Is] intact \par Quadriceps Tendon [Is] intact\par Patella Tendon [Is] intact\par \par The Final Radiologist Impression:\par Osseous structures: \par No fracture or osteonecrosis.\par \par Cruciate ligaments:\par Mild mucoid degenerative change of the anterior cruciate ligament with intraosseous ganglion cyst formation at the distal attachment on the tibia.\par Intact posterior cruciate ligament (PCL).\par \par Collateral ligaments:\par Intact medial collateral ligament (MCL).\par Intact lateral collateral ligamentous complex (biceps femoris tendon, fibular collateral ligament, and iliotibial band).\par No posteromedial or posterolateral corner injury.\par \par Menisci: \par There is a radial tear of the medial meniscus at the junction of the posterior horn and root ligament with 0.3 cm peripheral extrusion the body segment. Background intrasubstance degeneration noted in the body and posterior horn the medial meniscus.\par There is mild intrasubstance degeneration in the anterior horn of lateral meniscus. No lateral meniscus tears are seen.\par \par Articular cartilage: \par Moderate degenerative chondral thinning over the mid to posterior weightbearing surface of the medial femoral condyle. There is a full-thickness fissure and 0.5 cm chondral flap at the posterior weightbearing surface of the medial femoral condyle, (series 12, image 19).\par Normal articular cartilage within lateral compartment without chondral thinning or defect.\par High-grade cartilage loss seen of the lateral patellar facet and trochlea with associated subchondral edema and cystic change.\par \par Extensor mechanism: \par Normal visualized distal quadriceps tendon without tendinosis, peritendinitis, or tear.\par Normal patellar tendon without tendinosis, peritendinitis, or tear.\par Hypoplastic medial trochlear facet and flattening the femoral trochlear groove in keeping with trochlear dysplasia. There is a prominent medial supratrochlear spur. \par The TT-TG (tibial tubercle to trochlear groove) distance is borderline increased measuring 1.8  cm (borderline: 1.5-2.0 cm). The patellar tendon to patellar ratio is within normal limits measuring 1.4, (normal Insall-Salvati index: 0.74-1.5). \par \par Knee joint:\par A small joint effusion is present.\par A small popliteal cyst is present. 3.5 cm popliteal cyst is seen.\par No visualized intra-articular bodies.\par \par Muscles and nerves: \par No intramuscular edema or atrophy. \par The included sciatic, tibial, and common peroneal nerves are unremarkable.\par \par Proximal tibiofibular joint:\par Normal\par \par Other soft tissues:\par Normal\par \par IMPRESSION:  MRI of the right knee demonstrates: \par \par 1.  Radial tear of the medial meniscus at the junction of the posterior horn and root ligament. Mild peripheral extrusion the body segment.\par 2.  Moderate degenerative cartilage loss with focal full-thickness fissure and 0.5 cm chondral flap at the posterior weightbearing surface of the medial femoral condyle.\par 3.  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  High-grade cartilage loss in the patellofemoral compartment.\par 5.  Small joint effusion and popliteal cyst.\par \par MRI Left knee from [facility] on 6/\par My impression of the images:\par Quality of the MRI is good\par Medial Meniscus small free edge radial tear posterior horn mild extrusion of the body\par Lateral Meniscus ok\par There is severe patellofemoral chondral loss and focal weightbearing medial femoral condyle chondral loss\par There is [Not] bone marrow edema[/subchondral cysts] in the tri compartments\par LCL [Is] intact\par MCL [Is] intact\par ACL [Is] intact\par PCL [Is] intact \par Quadriceps Tendon [Is] intact\par Patella Tendon [Is] intact\par \par The Final Radiologist Impression:\par Osseous structures: \par No fracture or osteonecrosis.\par \par Cruciate ligaments:\par Mild mucoid degenerative change of the anterior cruciate ligament with intraosseous ganglion cyst formation at the distal attachment on the tibia.\par Intact posterior cruciate ligament (PCL).\par \par Collateral ligaments:\par Intact medial collateral ligament (MCL).\par Intact lateral collateral ligamentous complex (biceps femoris tendon, fibular collateral ligament, and iliotibial band).\par No posteromedial or posterolateral corner injury.\par \par Menisci: \par There is a radial tear of the medial meniscus at the junction of the posterior horn and root ligament with 0.3 cm peripheral extrusion the body segment. Background intrasubstance degeneration noted in the body and posterior horn the medial meniscus.\par There is mild intrasubstance degeneration in the anterior horn of lateral meniscus. No lateral meniscus tears are seen.\par \par Articular cartilage: \par Moderate degenerative chondral thinning over the mid to posterior weightbearing surface of the medial femoral condyle. There is a full-thickness fissure and 0.5 cm chondral flap at the posterior weightbearing surface of the medial femoral condyle, (series 12, image 19).\par Normal articular cartilage within lateral compartment without chondral thinning or defect.\par High-grade cartilage loss seen of the lateral patellar facet and trochlea with associated subchondral edema and cystic change.\par \par Extensor mechanism: \par Normal visualized distal quadriceps tendon without tendinosis, peritendinitis, or tear.\par Normal patellar tendon without tendinosis, peritendinitis, or tear.\par Hypoplastic medial trochlear facet and flattening the femoral trochlear groove in keeping with trochlear dysplasia. There is a prominent medial supratrochlear spur. \par The TT-TG (tibial tubercle to trochlear groove) distance is borderline increased measuring 1.8  cm (borderline: 1.5-2.0 cm). The patellar tendon to patellar ratio is within normal limits measuring 1.4, (normal Insall-Salvati index: 0.74-1.5). \par \par Knee joint:\par A small joint effusion is present.\par A small popliteal cyst is present. 3.5 cm popliteal cyst is seen.\par No visualized intra-articular bodies.\par \par Muscles and nerves: \par No intramuscular edema or atrophy. \par The included sciatic, tibial, and common peroneal nerves are unremarkable.\par \par Proximal tibiofibular joint:\par Normal\par \par Other soft tissues:\par Normal\par \par IMPRESSION:  MRI of the right knee demonstrates: \par \par 1.  Radial tear of the medial meniscus at the junction of the posterior horn and root ligament. Mild peripheral extrusion the body segment.\par 2.  Moderate degenerative cartilage loss with focal full-thickness fissure and 0.5 cm chondral flap at the posterior weightbearing surface of the medial femoral condyle.\par 3.  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  High-grade cartilage loss in the patellofemoral compartment.\par 5.  Small joint effusion and popliteal cyst.\par \par \par  I discussed my findings on the imaging at length and correlated to the physical examination\par \par I discussed at length with the patient's her options including nonoperative and operative modalities including possible consideration of arthroscopic management for excision of the meniscus given the continued lack of improvement with nonoperative management to date.  However given the physical examination and correlation with the MRI and the location of the meniscus there is a lower likelihood of symptomatic relief with arthroscopic management of the meniscus tear and more likelihood of continued symptomology from the osteoarthritic changes of the medial and patellofemoral compartments.\par \par Patient should have allergy radiology work-up for the various components of the viscosupplementation for possible allergic reaction for consideration of further viscosupplementation injections.\par \par Patient can also consider Zilretta injection as a long-term slow elution corticosteroid injection.\par \par FU PRN\par

## 2022-08-16 ENCOUNTER — APPOINTMENT (OUTPATIENT)
Dept: RHEUMATOLOGY | Facility: CLINIC | Age: 52
End: 2022-08-16

## 2022-08-16 VITALS
SYSTOLIC BLOOD PRESSURE: 120 MMHG | RESPIRATION RATE: 16 BRPM | HEIGHT: 68 IN | HEART RATE: 69 BPM | DIASTOLIC BLOOD PRESSURE: 80 MMHG | BODY MASS INDEX: 33.34 KG/M2 | TEMPERATURE: 98.7 F | WEIGHT: 220 LBS | OXYGEN SATURATION: 95 %

## 2022-08-16 PROCEDURE — 99214 OFFICE O/P EST MOD 30 MIN: CPT

## 2022-08-16 NOTE — HISTORY OF PRESENT ILLNESS
[FreeTextEntry1] : Patient returns for followup after initiating Adalimumab with  moderate improvement in hip and lumbar tightness, by 70%, , increasing LE mobility.   Patient recently seen by vascular colleagues, consult appreciated and recommended to wear compression stockings for varicosities.\par \par Hip and spine and hip films reflect DJD changes ; SI joint without erosive sequela.  MR imaging performed in October 2021 reflects mild multilevel annular bulge/ osteophyte formation most notable at L4-5 and L5-S1 without foraminal stenosis with endplate marrow signal changes at L4-5.  Patient with  testing reflecting HLA-B27 positivity; patient with improved sedimentation rate of 17 mm/h otherwise normal markers.  \par Blood testing reflecting normal surveillance labs including QF gold testing and hepatitis testing.  Prior IgG+ C. Trachomatis noted. Patient had a fleeting low titer CCP antibody in the past as well as a low titer CAITIE of 1:80 nucleolar pattern. Many encounters ago, the patient had a psoriatic patch over the elbow which she notes was remedied by emollients application.\par \par She denies photosensitivity or Raynauds.  She further denies visual/ GI disturbances, oral ulcers, dyspnea, chest pain, motor disturbances.

## 2022-08-16 NOTE — ASSESSMENT
[FreeTextEntry1] : Patient has had long-standing arthralgias, bilateral knee pain secondary to mechanical disease;  moderate improvement hip pain in the setting of HLA-B27 positivity:\par \par Patient to c/w TNF-alpha inhibitor for a spondyloarthropathy presentation.  Discussed increased risk of serious infections related to biologic therapy and the importance of regular vaccinations.\par Patient will benefit from pain and physical therapy to help with joint mobility and muscle strengthening; requisition given.  Quadriceps strengthening exercises demonstrated in the office.  Weight loss has been encouraged to reduce load over the medial joint line.  Viscosupplementation has been encouraged to provide additional lubrication and joint support.  Recommend orthopedic guidance for additional intervention.  In the interim, Diclofenac gel has been rec.  Core strengthening and exercise modification emphasized.\par \par She is in agreement with the above plan and will return in two months' time.\par

## 2022-08-16 NOTE — CONSULT LETTER
[Dear  ___] : Dear  [unfilled], [Courtesy Letter:] : I had the pleasure of seeing your patient, [unfilled], in my office today. [Please see my note below.] : Please see my note below. [Consult Closing:] : Thank you very much for allowing me to participate in the care of this patient.  If you have any questions, please do not hesitate to contact me. [Sincerely,] : Sincerely, [FreeTextEntry2] : Winsome Garcia MD  [FreeTextEntry3] : Yamileth Reveles M.D.\par  of Medicine \par [facility] [employer] [facility]/[place]\par \par

## 2022-08-26 ENCOUNTER — APPOINTMENT (OUTPATIENT)
Dept: PAIN MANAGEMENT | Facility: CLINIC | Age: 52
End: 2022-08-26

## 2022-08-26 PROCEDURE — 99204 OFFICE O/P NEW MOD 45 MIN: CPT

## 2022-08-26 NOTE — PHYSICAL EXAM
[de-identified] : Gen: NAD\par Gait: able to heel and toe walk, gait intact\par ROM: limited AROM of the L spine in all planes worst in extension, full ROM at ankles, knees and hips\par Sensation: intact to light touch in all dermatomes tested from L2-S1 bilaterally\par Reflexes: 2+ b/l patella and Achilles, biceps and triceps, plantar response down going \par Palpation: Diffusely tender over lumbar paraspinals,  +TTP over the b/l greater trochanters and +TTP b/l SI joint\par Provacative tests: - SLR, seated root (slump test), pos Harrison (lumbar rotation and extension) L/R, neg LUIS testing, FADIR testing\par \par 				Right	Left\par L2/3	Hip flexion		 5	 5\par L3/4	Knee Extension		 5	 5\par L4/5	Ankle Dorsiflexion	 5	 5\par L5/S1	Great toe extension	 5	 5\par L4/5	Inversion		 5	 5\par L5/S1	Eversion		 5	 5\par L5/S1	Hip Abudction		 4	 4\par S1/2	Hip Extension		 4	 4\par S1/2	Plantar Fexion		 5	 5\par \par \par

## 2022-08-26 NOTE — HISTORY OF PRESENT ILLNESS
[FreeTextEntry1] : Ms. MCFADDEN is a 51 year old F w/ HLA B-27, who presents for initial evaluation for multi-joint pain involving the back and knees. She reported longstanding low back pain greater than 3 months with pain rated 7/10 or higher. It is not associated with any inciting injury. Pain is localized to the lower back.  Pain is described as dull and aching. Pain does not radiate to the lower extremities. Pain is worsened with extension and rotation of the lumbar spine. Patient has failed conservative treatment with acetaminophen, NSAIDs, muscle relaxants, and physical therapy. Pain is causing significant functional limitation resulting in diminished quality of life and impaired age appropriate ADL's of making bed and grooming. Patient denies numbness/tingling, loss of bowel/bladder function, new motor deficits, fevers, or chills.\par \par She also reports bilateral knee pain, rated 7/10, aching, throbbing, nonradiating, worse with walking and putting pressure on the knees, alleviated at rest. She typically receives HA injection with her rheumatologist (Dr. Reveles), which helps to alleviate her symptoms. Her last HA injection was 8/2021 and is set for a repeat HA injection for 9/2022 with her rheumatologist. \par \par \par Imaging Reviewed: \par MRI L-spine 10/2/21\par MRI R Knee 6/22/22

## 2022-08-26 NOTE — DISCUSSION/SUMMARY
[de-identified] : \par \par After discussing various treatment options with the patient including but not limited to oral medications, physical therapy, exercise, modalities as well as interventional spinal injections, we have decided with the following plan: \par \par - pharmacological: c/w diclofenac (can consider NAVDEEP-said), flexeril 10mg qhs prn, gabapentin 100mg qhs (can uptitrate to 100mg TID). discussed the benefit taking gabapentin regularly for the medicine to work. \par - Imaging: I personally reviewed the radiological images and agree with the radiologist's report. The radiological findings were discussed with the patient. MRI L-spine 10/2/22 consistent with lumbar spondylosis. \par - Interventional: \par   - Lumbar spondylosis: schedule for 1st diagnostic right then left L2 - L5 medial branch block. Procedure explained using an anatomical model.  Risks and benefits explained to patient who verbalized understanding, including increased risk of infection, bleeding, nerve injury. \par   - Knee pain: reasonable to repeat SCHULTZ injection with Dr. JABARI Reveles. We discussed genicular nerve blocks, we can consider this procedure if minimal benefit from HA injections\par - rehabilitative: c/w HEP/PT \par - encouraged close follow up with Dr. JABARI Reveles\par - follow up 1-2 weeks post-procedure\par \par Yury Reveles MD\par \par \par \par \par Indications for Medial Branch Block for this specific patient include the following \par \par - Pt has had symptoms for at least three months with moderate to severe pain with functional impairment rated of at least 7/10 pain. \par - Pain non responsive to conservative care including but not limited to physical therapy, NSAIDs and acetaminophen for at least 6 weeks within the past 6 months\par 	- or patient is in intractable pain an unable to comply with a physical therapy course \par - Pain predominately axial and not associated with radiculopathy or claudication.  \par - No non-facet pathology as source of pain.  \par - Clinical assessment implicates facet joint as putative pain source.  \par - Pain is exacerbated by extension or prolonged sitting/standing and relieved by rest.  \par - Pain worse with extension and rotation \par - No unexplained neurologic deficit.  \par - No history of coagulopathy, infection or unstable medical conditions.  \par - Pain is causing significant functional limitation resulting in diminished quality of life and impaired age appropriate ADL's. \par - clinical assessment implicates facet joint as putative source of pain\par - If response to medial branch blocks are positive, will consider radiofrequency ablation \par -  Repeat injections not done prior to 7 days and prior injection resulted in increased functional gains and 80% pain relief with subsequent deterioration of functional gains and return of pain \par -  no more than 3 levels will be done  \par - radiofrequency facet ablation is being considered\par - will not be done to treat pain arising from above C2-3 and below L5-S1 spinal levels\par 	- Diagnostic facet injections prior to RFA is not done in patients with neurologic abnormalities, more than one pain syndrome, definitive clinical and or imaging findings pointing to a specific diagnosis other than facet joint sydrome and spinal surgery at the clinically suspected levels.  \par 	- Pt has failed conservative therapy.  Which consists of an appropriate combination of medication (for example, NSAIDs, analgesics, etc.) in addition to physical therapy, spinal manipulation therapy, or cognitive behavioral therapy (CBT). \par 	-Clinically significant improvement has not occurred (the pain remains at a 3 or more on a 1-10 pain scale) after a minimum of four weeks of conservative care(including but not limited to pharmacotherapy, exercise, or physical\par therapy)\par 	- To localize the level of facet joint pain in persons with suspected facet joint pain in persons suspected of facet joint as based on clinical exam \par 	- only performed for levels C3 and below \par 	- therapeutic injections will not be done\par 	Clinical findings and imaging studies suggest no other cause of the pain (e.g., spinal stenosis with neurogenic claudication, disc herniation with radicular pain, infection, tumor, fracture, pain related to prior surgery);\par 	- The spinal segment is not fused\par 	- A second Facet Joint Injection/Medial Branch Block performed to confirm the validity  of the clinical response to the initial Facet Joint Injection, when all of the following criteria are met:  \par - Administered at the same level and side as the initial block  \par - The initial diagnostic Facet  Joint Injection produced a positive response as demonstrated when all the following criteria are met:?For  at least the expected minimum  duration of the effect of the local  anesthetic; and ?Functional  improvement that is specific  to the individual with demonstrable  improvement  in the physical  functions previously limited by the facetogenic  pain; and Radiofrequency joint denervation/ablation is being considered\par - will not be performed in the presence of untreated radiculopathy at the same levels as the intended diagnostic injection (with the exception of radiculopathy caused by a facet joint synovial cyst) \par 	- Injection of volume of local anesthetic will not exceed 0.5ml\par 	- Treatments tried, failed, or contraindicated; include the dates and reason for discontinuation\par including but not limited to: (at least 4 weeks)\par o Pharmacotherapy 5/1/22 to 8/1/22 discontinued due to lack of efficacy \par o Exercise 5/1/22 to 8/1/22 discontinued due to lack of efficacy \par o Physical Therapy 11/2/21 to 2/1/22 discontinued due to lack of efficacy \par

## 2022-08-26 NOTE — REASON FOR VISIT
[Initial Visit] : an initial pain management visit [FreeTextEntry2] : History of Lumbar/ Dagoberto Hip and Knee pain

## 2022-08-26 NOTE — ASSESSMENT
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 A component of her pain may be from sacroiliitis, and GTB bursitis.

## 2022-09-29 ENCOUNTER — APPOINTMENT (OUTPATIENT)
Dept: RHEUMATOLOGY | Facility: CLINIC | Age: 52
End: 2022-09-29

## 2022-09-29 VITALS
WEIGHT: 225 LBS | DIASTOLIC BLOOD PRESSURE: 92 MMHG | HEIGHT: 68 IN | HEART RATE: 101 BPM | RESPIRATION RATE: 16 BRPM | OXYGEN SATURATION: 96 % | TEMPERATURE: 97.8 F | BODY MASS INDEX: 34.1 KG/M2 | SYSTOLIC BLOOD PRESSURE: 142 MMHG

## 2022-09-29 PROCEDURE — 20610 DRAIN/INJ JOINT/BURSA W/O US: CPT | Mod: 50

## 2022-09-29 RX ORDER — HYALURONATE SODIUM 20 MG/2 ML
20 SYRINGE (ML) INTRAARTICULAR
Qty: 0 | Refills: 0 | Status: COMPLETED | OUTPATIENT
Start: 2022-09-29

## 2022-09-29 RX ADMIN — Medication 0 MG/2ML: at 00:00

## 2022-09-29 NOTE — ASSESSMENT
[FreeTextEntry1] : Patient received #1 of 3 of Euflexxa for b/l knees:\par \par Patient will benefit from continued physical therapy to help with joint mobility and muscle strengthening.  Encouraged to hydrate and minimal weight bearing for the next 24 hours along with ice application 20 mins on/ 20 mins off.  She is in agreement with the above plan and will return in one weeks' time.\par

## 2022-09-29 NOTE — PROCEDURE
[Today's Date:] : Date: [unfilled] [Patient] : the patient [Risks] : risks [Benefits] : benefits [Consent Obtained] : written consent was obtained prior to the procedure and is detailed in the patient's record [Therapeutic] : therapeutic [#1 Site: ______] : #1 site identified in the [unfilled] [#2 Site: ___] : # 2 site identified in the [unfilled] [Ethyl Chloride] : ethyl chloride [Betadine] : with betadine solution [25 gauge 1.5 inch] : A 25 gauge 1.5 inch needle was used [___ml Visccosupplementation] : [unfilled] ml of viscosupplementation [Tolerated Well] : the patient tolerated the procedure well [No Complications] : there were no complications [Patient Instructed to Call] : patient was instructed to call if redness at site, a decrease in range of motion or an increase in pain is noted after procedure.

## 2022-10-06 ENCOUNTER — APPOINTMENT (OUTPATIENT)
Dept: RHEUMATOLOGY | Facility: CLINIC | Age: 52
End: 2022-10-06

## 2022-10-06 VITALS
RESPIRATION RATE: 16 BRPM | WEIGHT: 225 LBS | HEART RATE: 65 BPM | DIASTOLIC BLOOD PRESSURE: 80 MMHG | SYSTOLIC BLOOD PRESSURE: 123 MMHG | BODY MASS INDEX: 34.1 KG/M2 | OXYGEN SATURATION: 98 % | TEMPERATURE: 98 F | HEIGHT: 68 IN

## 2022-10-06 PROCEDURE — 20610 DRAIN/INJ JOINT/BURSA W/O US: CPT | Mod: 50

## 2022-10-06 RX ORDER — HYALURONATE SODIUM 20 MG/2 ML
20 SYRINGE (ML) INTRAARTICULAR
Qty: 0 | Refills: 0 | Status: COMPLETED | OUTPATIENT
Start: 2022-10-06

## 2022-10-06 RX ADMIN — Medication 0 MG/2ML: at 00:00

## 2022-10-06 NOTE — PROCEDURE
[Today's Date:] : Date: [unfilled] [Patient] : the patient [Risks] : risks [Benefits] : benefits [Consent Obtained] : written consent was obtained prior to the procedure and is detailed in the patient's record [Therapeutic] : therapeutic [#1 Site: ______] : #1 site identified in the [unfilled] [#2 Site: ___] : # 2 site identified in the [unfilled] [Ethyl Chloride] : ethyl chloride [Betadine] : with betadine solution [25 gauge 1.5 inch] : A 25 gauge 1.5 inch needle was used [___ml Visccosupplementation] : [unfilled] ml of viscosupplementation [Tolerated Well] : the patient tolerated the procedure well [No Complications] : there were no complications [Patient Instructed to Call] : patient was instructed to call if redness at site, a decrease in range of motion or an increase in pain is noted after procedure. [FreeTextEntry1] : Received #2 of 3 series of Euflexxa: \par \par Patient will benefit from continued physical therapy to help with joint mobility and muscle strengthening.  Encouraged to hydrate and minimal weight bearing for the next 24 hours along with ice application 20 mins on/ 20 mins off.  She is in agreement with the above plan and will return in one weeks' time.\par

## 2022-10-13 ENCOUNTER — APPOINTMENT (OUTPATIENT)
Dept: RHEUMATOLOGY | Facility: CLINIC | Age: 52
End: 2022-10-13

## 2022-10-13 VITALS
OXYGEN SATURATION: 98 % | HEART RATE: 73 BPM | HEIGHT: 68 IN | WEIGHT: 225 LBS | BODY MASS INDEX: 34.1 KG/M2 | RESPIRATION RATE: 16 BRPM | TEMPERATURE: 97.8 F | SYSTOLIC BLOOD PRESSURE: 127 MMHG | DIASTOLIC BLOOD PRESSURE: 82 MMHG

## 2022-10-13 PROCEDURE — 20610 DRAIN/INJ JOINT/BURSA W/O US: CPT | Mod: 50

## 2022-10-13 RX ORDER — HYALURONATE SODIUM 20 MG/2 ML
20 SYRINGE (ML) INTRAARTICULAR
Qty: 0 | Refills: 0 | Status: COMPLETED | OUTPATIENT
Start: 2022-10-13

## 2022-10-13 RX ADMIN — Medication 0 MG/2ML: at 00:00

## 2022-10-13 NOTE — ASSESSMENT
[FreeTextEntry1] : Patient received #3 of 3 of Euflexxa for b/l knees:\par \par Patient will benefit from continued physical therapy to help with joint mobility and muscle strengthening.  Encouraged to hydrate and minimal weight bearing for the next 24 hours along with ice application 20 mins on/ 20 mins off.  She is in agreement with the above plan and will return in two months' time.\par

## 2022-10-15 ENCOUNTER — TRANSCRIPTION ENCOUNTER (OUTPATIENT)
Age: 52
End: 2022-10-15

## 2022-10-19 ENCOUNTER — TRANSCRIPTION ENCOUNTER (OUTPATIENT)
Age: 52
End: 2022-10-19

## 2022-10-25 ENCOUNTER — APPOINTMENT (OUTPATIENT)
Dept: PAIN MANAGEMENT | Facility: CLINIC | Age: 52
End: 2022-10-25

## 2022-10-25 PROCEDURE — 64495 INJ PARAVERT F JNT L/S 3 LEV: CPT | Mod: LT,59

## 2022-10-25 PROCEDURE — 64494 INJ PARAVERT F JNT L/S 2 LEV: CPT | Mod: LT,59

## 2022-10-25 PROCEDURE — J3490M: CUSTOM

## 2022-10-25 PROCEDURE — 64493 INJ PARAVERT F JNT L/S 1 LEV: CPT | Mod: LT

## 2022-10-25 NOTE — PROCEDURE
[FreeTextEntry3] : Date of Service: 10/25/2022 \par \par Account: [id number] \par \par Patient: MARKEL MCFADDEN \par \par YOB: 1970 \par \par Age: 51 year \par \par \par Surgeon:                                               Yury Reveles M.D.\par \par Assistant:                                              None.\par \par Pre-Operative Diagnosis:                  Spondylosis of Lumbar Region without Myelopathy or Radiculopathy (M47.816)     \par \par Post Operative Diagnosis:                 Spondylosis of Lumbar Region without Myelopathy or Radiculopathy (M47.816)     \par \par Procedure:                                            Left L3-4, L4-5, L5-S1  Facet block under fluoroscopic guidance.\par \par Anesthesia:                                           MAC\par \par \par This procedure was carried out using fluoroscopic guidance.  The risks and benefits of the procedure were discussed extensively with the patient.  The consent of the patient was obtained and the following procedure was performed.\par \par The patient was placed in the prone position.  The patient's back was prepped and draped in a sterile fashion.  The lumbar vertebral bodies were identified and the fluoroscope left obliqued to approximately 30 degrees to reveal good "Rg-dog" anatomical view.  The junction of the superior articulate process and transverse process at the L2,L3, L4, and L5 levels were identified and marked. The skin at these target points was then localized using 1 cc of 1% Lidocaine without epinephrine at each injection site.  A spinal needle was then introduced and advanced at these four levels to the above target points at the junction of the SAP and transverse processes until os was contacted.  Fluoroscope then focused on the left sacral ala on AP view, and marked at this point.  The skin and subcutaneous structures were localized using 1cc of 1.0 % lidocaine without epinephrine.  A spinal needle was then advanced under fluoroscopic guidance until os was contacted at the ala.  After negative aspiration for heme and CSF, an injectate of 1cc 0.25% marcaine was injected at each of the five injection sites.\par \par The needles were then removed and pressure was applied.  Anesthesia personnel were present throughout the procedure.\par \par The patient was instructed to apply ice over the injection site for twenty minutes every two hours for the next 24 to 48 hours.  The patient was also instructed to contact me immediately if there were any problems.\par \par Yury Reveles M.D.\par

## 2022-11-08 ENCOUNTER — APPOINTMENT (OUTPATIENT)
Dept: PAIN MANAGEMENT | Facility: CLINIC | Age: 52
End: 2022-11-08

## 2022-11-08 PROCEDURE — 64494 INJ PARAVERT F JNT L/S 2 LEV: CPT | Mod: RT,59

## 2022-11-08 PROCEDURE — 64493 INJ PARAVERT F JNT L/S 1 LEV: CPT | Mod: RT

## 2022-11-08 PROCEDURE — 64495 INJ PARAVERT F JNT L/S 3 LEV: CPT | Mod: RT,59

## 2022-11-08 PROCEDURE — J3490M: CUSTOM

## 2022-11-08 NOTE — PROCEDURE
[FreeTextEntry3] : Date of Service: 11/08/2022 \par \par Account: [id number] \par \par Patient: MARKEL MCFADDEN \par \par YOB: 1970 \par \par Age: 51 year \par \par \par Surgeon:                                               Yury Reveles M.D.\par \par Assistant:                                              None.\par \par Pre-Operative Diagnosis:                  Spondylosis of Lumbar Region without Myelopathy or Radiculopathy (M47.816)     \par \par Post Operative Diagnosis:                 Spondylosis of Lumbar Region without Myelopathy or Radiculopathy (M47.816)     \par \par Procedure:                                            Right  L3-4, L4-5, L5-S1  Facet block under fluoroscopic guidance.\par \par Anesthesia:                                           MAC\par \par \par This procedure was carried out using fluoroscopic guidance.  The risks and benefits of the procedure were discussed extensively with the patient.  The consent of the patient was obtained and the following procedure was performed.\par \par The patient was placed in the prone position.  The patient's back was prepped and draped in a sterile fashion.  The lumbar vertebral bodies were identified and the fluoroscope right obliqued to approximately 30 degrees to reveal good "Rg-dog" anatomical view.  The junction of the superior articulate process and transverse process at the L2,L3, L4, and L5 levels were identified and marked. The skin at these target points was then localized using 1 cc of 1% Lidocaine without epinephrine at each injection site.  A spinal needle was then introduced and advanced at these four levels to the above target points at the junction of the SAP and transverse processes until os was contacted.  Fluoroscope then focused on the left sacral ala on AP view, and marked at this point.  The skin and subcutaneous structures were localized using 1cc of 1.0 % lidocaine without epinephrine.  A spinal needle was then advanced under fluoroscopic guidance until os was contacted at the ala.  After negative aspiration for heme and CSF, an injectate of 1cc 0.25% marcaine was injected at each of the five injection sites.\par \par The needles were then removed and pressure was applied.  Anesthesia personnel were present throughout the procedure.\par \par The patient was instructed to apply ice over the injection site for twenty minutes every two hours for the next 24 to 48 hours.  The patient was also instructed to contact me immediately if there were any problems.\par \par Yury Reveles M.D.\par

## 2022-11-16 ENCOUNTER — RX RENEWAL (OUTPATIENT)
Age: 52
End: 2022-11-16

## 2022-11-23 ENCOUNTER — APPOINTMENT (OUTPATIENT)
Dept: PAIN MANAGEMENT | Facility: CLINIC | Age: 52
End: 2022-11-23

## 2022-11-23 VITALS — BODY MASS INDEX: 34.1 KG/M2 | WEIGHT: 225 LBS | HEIGHT: 68 IN

## 2022-11-23 DIAGNOSIS — M53.3 SACROCOCCYGEAL DISORDERS, NOT ELSEWHERE CLASSIFIED: ICD-10-CM

## 2022-11-23 PROCEDURE — 99214 OFFICE O/P EST MOD 30 MIN: CPT

## 2022-11-23 NOTE — PHYSICAL EXAM
[de-identified] : Gen: NAD\par Gait: able to heel and toe walk, gait intact\par ROM: limited AROM of the L spine in all planes worst in extension, full ROM at ankles, knees and hips\par Sensation: intact to light touch in all dermatomes tested from L2-S1 bilaterally\par Reflexes: 2+ b/l patella and Achilles, biceps and triceps, plantar response down going \par Palpation: Diffusely tender over lumbar paraspinals, +TTP over the b/l greater trochanters and +TTP b/l SI joint\par Provacative tests: - SLR, seated root (slump test), pos Harrison (lumbar rotation and extension) L/R, pos LUIS testing bilaterally, negative FADIR testing\par \par 				Right	Left\par L2/3	Hip flexion		 5	 5\par L3/4	Knee Extension		 5	 5\par L4/5	Ankle Dorsiflexion	 5	 5\par L5/S1	Great toe extension	 5	 5\par L4/5	Inversion		 5	 5\par L5/S1	Eversion		 5	 5\par L5/S1	Hip Abudction		 4	 4\par S1/2	Hip Extension		 4	 4\par S1/2	Plantar Fexion		 5	 5\par

## 2022-11-23 NOTE — ASSESSMENT
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 She also has new neck pain that is likely myofascial in origin. A component of her pain may be from GTB bursitis. \par \par

## 2022-11-23 NOTE — DISCUSSION/SUMMARY
[de-identified] : After discussing various treatment options with the patient including but not limited to oral medications, physical therapy, exercise, modalities as well as interventional spinal injections, we have decided with the following plan: \par \par - pharmacological: \par   - c/w diclofenac (can consider NAVDEEP-said) \par   - change flexeril 10mg qhs prn to tizanidine 4mg TID prn, \par   - gabapentin 200mg BID (recommended uptitration to 300mg TID) \par - Interventional: \par   - minimal benefit from 1st diagnostic right then left L2 - L5 medial branch block. \par   - schedule for b/l SIJ injection. Procedure explained using an anatomical model. Risks and benefits explained to patient who verbalized understanding, including increased risk of infection, bleeding, nerve injury. \par  - Knee pain: reasonable to repeat SCHULTZ injection with Dr. JABARI Reveles. We discussed genicular nerve blocks, we can consider this procedure if minimal benefit from HA injections\par - rehabilitative: referral given for PT to target neck pain, low back, knee pain\par - encouraged close follow up with Dr. JABARI Reveles\par - follow up for procedure or as needed\par \par Yury Reveles MD\par

## 2022-11-23 NOTE — REASON FOR VISIT
[Follow-Up Visit] : a follow-up pain management visit [FreeTextEntry2] : History of Lumbar/ Dagoberto Hip and Knee pain

## 2022-11-23 NOTE — HISTORY OF PRESENT ILLNESS
[Lower back] : lower back [7] : 7 [Dull/Aching] : dull/aching [Constant] : constant [Household chores] : household chores [Leisure] : leisure [Social interactions] : social interactions [Bending forward] : bending forward [Extending back] : extending back [9] : 9 [Throbbing] : throbbing [Tightness] : tightness [Meds] : meds [] : no [FreeTextEntry1] : B/L KNEES

## 2023-01-19 ENCOUNTER — APPOINTMENT (OUTPATIENT)
Dept: RHEUMATOLOGY | Facility: CLINIC | Age: 53
End: 2023-01-19
Payer: COMMERCIAL

## 2023-01-19 VITALS
DIASTOLIC BLOOD PRESSURE: 77 MMHG | HEART RATE: 83 BPM | OXYGEN SATURATION: 98 % | SYSTOLIC BLOOD PRESSURE: 126 MMHG | TEMPERATURE: 98.1 F | HEIGHT: 68 IN | BODY MASS INDEX: 34.1 KG/M2 | RESPIRATION RATE: 16 BRPM | WEIGHT: 225 LBS

## 2023-01-19 DIAGNOSIS — I83.93 ASYMPTOMATIC VARICOSE VEINS OF BILATERAL LOWER EXTREMITIES: ICD-10-CM

## 2023-01-19 DIAGNOSIS — M54.50 LOW BACK PAIN, UNSPECIFIED: ICD-10-CM

## 2023-01-19 PROCEDURE — 99214 OFFICE O/P EST MOD 30 MIN: CPT

## 2023-01-20 PROBLEM — I83.93 VARICOSE VEINS OF LEGS: Status: ACTIVE | Noted: 2017-11-30

## 2023-01-20 NOTE — CONSULT LETTER
[Dear  ___] : Dear  [unfilled], [Courtesy Letter:] : I had the pleasure of seeing your patient, [unfilled], in my office today. [Please see my note below.] : Please see my note below. [Consult Closing:] : Thank you very much for allowing me to participate in the care of this patient.  If you have any questions, please do not hesitate to contact me. [Sincerely,] : Sincerely, [FreeTextEntry2] : Winsome Garcia MD  [FreeTextEntry3] : Yamileth Reveles M.D.\par  of Medicine \par [facility] at [facility]\par \par

## 2023-01-20 NOTE — ASSESSMENT
[FreeTextEntry1] : Patient has had long-standing arthralgias, bilateral knee pain secondary to mechanical disease;  moderate improvement hip pain in the setting of HLA-B27 positivity:\par \par Patient to c/w TNF-alpha inhibitor for a spondyloarthropathy presentation.  Discussed increased risk of serious infections related to biologic therapy and the importance of regular vaccinations.\par Patient will benefit from pain and physical therapy to help with joint mobility and muscle strengthening; requisition given.  Quadriceps strengthening exercises demonstrated in the office.  Weight loss has been encouraged to reduce load over the medial joint line.  Viscosupplementation has been encouraged to provide additional lubrication and joint support.  Recommend orthopedic guidance for additional intervention.  In the interim, Diclofenac gel has been rec.  Core strengthening and exercise modification emphasized. Neuropathic pain therapy continued.\par \par She is in agreement with the above plan and will return in two months' time.\par

## 2023-01-20 NOTE — HISTORY OF PRESENT ILLNESS
[FreeTextEntry1] : Patient returns for follow-up and explains tolerating adalimumab without complication with moderate improvement in hip and lumbar tightness, by 70%, , increasing LE mobility.  Patient reports moderate improvement in knee pain after Euflexxa viscosupplementation in October 2022.  She reports having tried nerve block to L3-S1 with minimal relief however finds the titration of gabapentin has improved lower extremity discomfort, pain management guidance appreciated.  Patient seen by vascular colleagues, consult appreciated and recommended to wear compression stockings for varicosities.\par Hip and spine and hip films reflect DJD changes ; SI joint without erosive sequela.  MR imaging performed in October 2021 reflects mild multilevel annular bulge/ osteophyte formation most notable at L4-5 and L5-S1 without foraminal stenosis with endplate marrow signal changes at L4-5.  Patient with  testing reflecting HLA-B27 positivity; patient with quelled sedimentation rate otherwise normal markers.  \par Blood testing reflecting normal surveillance labs including QF gold testing and hepatitis testing.  Prior IgG+ C. Trachomatis noted. Patient had a fleeting low titer CCP antibody in the past as well as a low titer CAITIE of 1:80 nucleolar pattern. Many encounters ago, the patient had a psoriatic patch over the elbow which she notes was remedied by emollients application.\par \par She denies photosensitivity or Raynauds.  She further denies visual/ GI disturbances, oral ulcers, dyspnea, chest pain, motor disturbances.

## 2023-01-20 NOTE — PHYSICAL EXAM
[General Appearance - Alert] : alert [General Appearance - In No Acute Distress] : in no acute distress [Sclera] : the sclera and conjunctiva were normal [] : the neck was supple [Auscultation Breath Sounds / Voice Sounds] : lungs were clear to auscultation bilaterally [Edema] : there was no peripheral edema [No Spinal Tenderness] : no spinal tenderness [Nail Clubbing] : no clubbing  or cyanosis of the fingernails [Musculoskeletal - Swelling] : no joint swelling seen [Motor Tone] : muscle strength and tone were normal [FreeTextEntry1] : Knees with mild tenderness along the medial joint lines, RT >LT  [Skin Lesions] : no skin lesions [Motor Exam] : the motor exam was normal [Oriented To Time, Place, And Person] : oriented to person, place, and time [Impaired Insight] : insight and judgment were intact

## 2023-01-24 LAB
ALBUMIN SERPL ELPH-MCNC: 4.3 G/DL
ALP BLD-CCNC: 101 U/L
ALT SERPL-CCNC: 21 U/L
ANION GAP SERPL CALC-SCNC: 14 MMOL/L
AST SERPL-CCNC: 17 U/L
BASOPHILS # BLD AUTO: 0.04 K/UL
BASOPHILS NFR BLD AUTO: 0.5 %
BILIRUB SERPL-MCNC: 0.8 MG/DL
BUN SERPL-MCNC: 10 MG/DL
CALCIUM SERPL-MCNC: 9.6 MG/DL
CHLORIDE SERPL-SCNC: 105 MMOL/L
CHOLEST SERPL-MCNC: 206 MG/DL
CO2 SERPL-SCNC: 23 MMOL/L
CREAT SERPL-MCNC: 0.68 MG/DL
CRP SERPL-MCNC: <3 MG/L
EGFR: 106 ML/MIN/1.73M2
EOSINOPHIL # BLD AUTO: 0.08 K/UL
EOSINOPHIL NFR BLD AUTO: 1.1 %
ERYTHROCYTE [SEDIMENTATION RATE] IN BLOOD BY WESTERGREN METHOD: 34 MM/HR
ESTIMATED AVERAGE GLUCOSE: 114 MG/DL
GLUCOSE SERPL-MCNC: 91 MG/DL
HBA1C MFR BLD HPLC: 5.6 %
HCT VFR BLD CALC: 35.7 %
HDLC SERPL-MCNC: 65 MG/DL
HGB BLD-MCNC: 11.8 G/DL
IMM GRANULOCYTES NFR BLD AUTO: 0.1 %
LDLC SERPL CALC-MCNC: 120 MG/DL
LYMPHOCYTES # BLD AUTO: 2.29 K/UL
LYMPHOCYTES NFR BLD AUTO: 30.7 %
M TB IFN-G BLD-IMP: NEGATIVE
MAN DIFF?: NORMAL
MCHC RBC-ENTMCNC: 30.2 PG
MCHC RBC-ENTMCNC: 33.1 GM/DL
MCV RBC AUTO: 91.3 FL
MONOCYTES # BLD AUTO: 0.47 K/UL
MONOCYTES NFR BLD AUTO: 6.3 %
NEUTROPHILS # BLD AUTO: 4.57 K/UL
NEUTROPHILS NFR BLD AUTO: 61.3 %
NONHDLC SERPL-MCNC: 142 MG/DL
PLATELET # BLD AUTO: 357 K/UL
POTASSIUM SERPL-SCNC: 4.1 MMOL/L
PROT SERPL-MCNC: 7.1 G/DL
QUANTIFERON TB PLUS MITOGEN MINUS NIL: 8.03 IU/ML
QUANTIFERON TB PLUS NIL: 0.03 IU/ML
QUANTIFERON TB PLUS TB1 MINUS NIL: -0.01 IU/ML
QUANTIFERON TB PLUS TB2 MINUS NIL: -0.01 IU/ML
RBC # BLD: 3.91 M/UL
RBC # FLD: 13 %
SODIUM SERPL-SCNC: 141 MMOL/L
TRIGL SERPL-MCNC: 110 MG/DL
TSH SERPL-ACNC: 1.41 UIU/ML
VIT B12 SERPL-MCNC: 312 PG/ML
WBC # FLD AUTO: 7.46 K/UL

## 2023-03-07 ENCOUNTER — RX RENEWAL (OUTPATIENT)
Age: 53
End: 2023-03-07

## 2023-03-14 ENCOUNTER — TRANSCRIPTION ENCOUNTER (OUTPATIENT)
Age: 53
End: 2023-03-14

## 2023-03-28 ENCOUNTER — APPOINTMENT (OUTPATIENT)
Dept: INTERNAL MEDICINE | Facility: CLINIC | Age: 53
End: 2023-03-28
Payer: COMMERCIAL

## 2023-03-28 ENCOUNTER — APPOINTMENT (OUTPATIENT)
Dept: RHEUMATOLOGY | Facility: CLINIC | Age: 53
End: 2023-03-28
Payer: COMMERCIAL

## 2023-03-28 VITALS
HEIGHT: 68 IN | OXYGEN SATURATION: 99 % | TEMPERATURE: 98 F | SYSTOLIC BLOOD PRESSURE: 116 MMHG | HEART RATE: 79 BPM | BODY MASS INDEX: 35.31 KG/M2 | DIASTOLIC BLOOD PRESSURE: 67 MMHG | RESPIRATION RATE: 16 BRPM | WEIGHT: 233 LBS

## 2023-03-28 DIAGNOSIS — E66.9 OBESITY, UNSPECIFIED: ICD-10-CM

## 2023-03-28 PROCEDURE — 99214 OFFICE O/P EST MOD 30 MIN: CPT

## 2023-03-28 PROCEDURE — 99213 OFFICE O/P EST LOW 20 MIN: CPT

## 2023-03-28 RX ORDER — DICLOFENAC SODIUM 1% 10 MG/G
1 GEL TOPICAL
Qty: 2 | Refills: 3 | Status: ACTIVE | COMMUNITY
Start: 2023-03-28 | End: 1900-01-01

## 2023-03-28 NOTE — COUNSELING
[Potential consequences of obesity discussed] : Potential consequences of obesity discussed [Benefits of weight loss discussed] : Benefits of weight loss discussed [Structured Weight Management Program suggested:] : Structured weight management program suggested [Encouraged to increase physical activity] : Encouraged to increase physical activity [Decrease Portions] : decrease portions [FreeTextEntry2] : low chol.diet [de-identified] : healthy eating,exercise [None] : None [Good understanding] : Patient has a good understanding of lifestyle changes and steps needed to achieve self management goal

## 2023-03-28 NOTE — ASSESSMENT
[FreeTextEntry1] : 53 yo  ENT.\par on Humira inj.for spondyloarthropathy,arthralgias\par h/o R.thyroid nodule,f/u US,poss.FNA\par refused mammogram\par DEXA.\par

## 2023-03-28 NOTE — PHYSICAL EXAM
[Well Developed] : well developed [Well-Appearing] : well-appearing [Normal Sclera/Conjunctiva] : normal sclera/conjunctiva [PERRL] : pupils equal round and reactive to light [EOMI] : extraocular movements intact [Normal Outer Ear/Nose] : the outer ears and nose were normal in appearance [Normal Oropharynx] : the oropharynx was normal [Normal Nasal Mucosa] : the nasal mucosa was normal [No JVD] : no jugular venous distention [No Lymphadenopathy] : no lymphadenopathy [Supple] : supple [No Respiratory Distress] : no respiratory distress  [No Accessory Muscle Use] : no accessory muscle use [Clear to Auscultation] : lungs were clear to auscultation bilaterally [Normal Rate] : normal rate  [Regular Rhythm] : with a regular rhythm [Normal S1, S2] : normal S1 and S2 [No Murmur] : no murmur heard [No Carotid Bruits] : no carotid bruits [No Abdominal Bruit] : a ~M bruit was not heard ~T in the abdomen [No Varicosities] : no varicosities [Pedal Pulses Present] : the pedal pulses are present [No Edema] : there was no peripheral edema [Normal Appearance] : normal in appearance [No Nipple Discharge] : no nipple discharge [No Axillary Lymphadenopathy] : no axillary lymphadenopathy [Soft] : abdomen soft [No Masses] : no abdominal mass palpated [No HSM] : no HSM [No CVA Tenderness] : no CVA  tenderness [No Spinal Tenderness] : no spinal tenderness [No Joint Swelling] : no joint swelling [Grossly Normal Strength/Tone] : grossly normal strength/tone [No Rash] : no rash [No Focal Deficits] : no focal deficits [Normal Gait] : normal gait [Normal Affect] : the affect was normal [Normal Insight/Judgement] : insight and judgment were intact [de-identified] : obese [de-identified] : no distinct thyroid enlargement,nodules appreciated

## 2023-03-28 NOTE — HISTORY OF PRESENT ILLNESS
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

## 2023-03-31 NOTE — PHYSICAL EXAM
[General Appearance - Alert] : alert [General Appearance - In No Acute Distress] : in no acute distress [Sclera] : the sclera and conjunctiva were normal [Edema] : there was no peripheral edema [No Spinal Tenderness] : no spinal tenderness [Nail Clubbing] : no clubbing  or cyanosis of the fingernails [Musculoskeletal - Swelling] : no joint swelling seen [Motor Tone] : muscle strength and tone were normal [Skin Lesions] : no skin lesions [Motor Exam] : the motor exam was normal [Oriented To Time, Place, And Person] : oriented to person, place, and time [Impaired Insight] : insight and judgment were intact [] : no respiratory distress [FreeTextEntry1] : No active synovitis ; knees with mild tenderness along the medial joint lines b/l

## 2023-03-31 NOTE — ASSESSMENT
[FreeTextEntry1] : Patient has had long-standing arthralgias, bilateral knee pain secondary to mechanical disease;  moderate improvement hip pain in the setting of HLA-B27 positivity:\par \par Patient to c/w TNF-alpha inhibitor for a spondyloarthropathy presentation.  Discussed increased risk of serious infections related to biologic therapy and the importance of regular vaccinations.\par Patient will benefit from pain and physical therapy to help with joint mobility and muscle strengthening; requisition given.  Quadriceps strengthening exercises demonstrated in the office.  Weight loss has been encouraged to reduce load over the medial joint line.  Viscosupplementation has been encouraged to provide additional lubrication and joint support.   In the interim, Diclofenac gel has been rec.  Core strengthening and exercise modification emphasized. Neuropathic pain therapy continued.\par \par She is in agreement with the above plan and will return in two months' time.\par

## 2023-03-31 NOTE — CONSULT LETTER
[Dear  ___] : Dear  [unfilled], [Courtesy Letter:] : I had the pleasure of seeing your patient, [unfilled], in my office today. [Please see my note below.] : Please see my note below. [Consult Closing:] : Thank you very much for allowing me to participate in the care of this patient.  If you have any questions, please do not hesitate to contact me. [Sincerely,] : Sincerely, [FreeTextEntry2] : Winsome Garcia MD  [FreeTextEntry3] : Yamileth Reveles M.D.\par  of Medicine \par [facility] [employer] [facility]/[place]\par \par

## 2023-03-31 NOTE — HISTORY OF PRESENT ILLNESS
[FreeTextEntry1] : Patient returns for follow-up and explains recent covid-19 infection with mild upper respiratory symptoms , skipping adalimumab dosing without complication.  Patient explains moderate improvement in hip and lumbar tightness, increasing LE mobility.  Patient however notes resurfacing of knee pain after Euflexxa viscosupplementation in October 2022.  She reports having tried nerve block to L3-S1 with minimal relief however finds the titration of gabapentin has improved lower extremity discomfort, pain management guidance appreciated.  Patient seen by vascular colleagues, consult appreciated and recommended to wear compression stockings for varicosities.\par Hip and spine and hip films reflect DJD changes ; SI joint without erosive sequela.  MR imaging performed in October 2021 reflects mild multilevel annular bulge/ osteophyte formation most notable at L4-5 and L5-S1 without foraminal stenosis with endplate marrow signal changes at L4-5.  Patient with  testing reflecting HLA-B27 positivity; patient with quelled sedimentation rate otherwise normal markers.  \par Blood testing reflecting normal surveillance labs including QF gold testing and hepatitis testing.  Prior IgG+ C. Trachomatis noted. Patient had a fleeting low titer CCP antibody in the past as well as a low titer CAITIE of 1:80 nucleolar pattern. Many encounters ago, the patient had a psoriatic patch over the elbow which she notes was remedied by emollients application.\par \par She denies photosensitivity or Raynauds.  She further denies visual/ GI disturbances, oral ulcers, dyspnea, chest pain, motor disturbances.

## 2023-05-18 ENCOUNTER — APPOINTMENT (OUTPATIENT)
Dept: RHEUMATOLOGY | Facility: CLINIC | Age: 53
End: 2023-05-18
Payer: COMMERCIAL

## 2023-05-18 VITALS
BODY MASS INDEX: 35.31 KG/M2 | HEIGHT: 68 IN | TEMPERATURE: 98.1 F | WEIGHT: 233 LBS | SYSTOLIC BLOOD PRESSURE: 129 MMHG | HEART RATE: 80 BPM | OXYGEN SATURATION: 98 % | DIASTOLIC BLOOD PRESSURE: 68 MMHG | RESPIRATION RATE: 13 BRPM

## 2023-05-18 DIAGNOSIS — M54.9 DORSALGIA, UNSPECIFIED: ICD-10-CM

## 2023-05-18 DIAGNOSIS — M47.812 SPONDYLOSIS W/OUT MYELOPATHY OR RADICULOPATHY, CERVICAL REGION: ICD-10-CM

## 2023-05-18 PROCEDURE — 20610 DRAIN/INJ JOINT/BURSA W/O US: CPT | Mod: 50

## 2023-05-18 RX ORDER — HYALURONATE SODIUM 20 MG/2 ML
20 SYRINGE (ML) INTRAARTICULAR
Qty: 0 | Refills: 0 | Status: COMPLETED | OUTPATIENT
Start: 2023-05-18

## 2023-05-18 RX ADMIN — Medication 0 MG/2ML: at 00:00

## 2023-05-18 NOTE — ASSESSMENT
[FreeTextEntry1] : Patient receives 1st round of Euflexxa to bilateral knees:\par Patient will benefit from continued physical therapy to help with joint mobility and muscle strengthening.  Encouraged to hydrate and minimal weight bearing for the next 24 hours along with ice application 20 mins on/ 20 mins off.  She is in agreement with the above plan and will return in one weeks' time.\par

## 2023-05-25 ENCOUNTER — APPOINTMENT (OUTPATIENT)
Dept: RHEUMATOLOGY | Facility: CLINIC | Age: 53
End: 2023-05-25
Payer: COMMERCIAL

## 2023-05-25 PROCEDURE — 20610 DRAIN/INJ JOINT/BURSA W/O US: CPT | Mod: 50

## 2023-05-25 RX ORDER — HYALURONATE SODIUM 20 MG/2 ML
20 SYRINGE (ML) INTRAARTICULAR
Qty: 0 | Refills: 0 | Status: COMPLETED | OUTPATIENT
Start: 2023-05-25

## 2023-05-25 RX ADMIN — Medication 0 MG/2ML: at 00:00

## 2023-05-25 NOTE — ASSESSMENT
[FreeTextEntry1] : Patient receives 2nd round of Euflexxa to bilateral knees:\par Patient will benefit from continued physical therapy to help with joint mobility and muscle strengthening.  Encouraged to hydrate and minimal weight bearing for the next 24 hours along with ice application 20 mins on/ 20 mins off.  She is in agreement with the above plan and will return in one weeks' time.\par

## 2023-06-01 ENCOUNTER — APPOINTMENT (OUTPATIENT)
Dept: RHEUMATOLOGY | Facility: CLINIC | Age: 53
End: 2023-06-01
Payer: COMMERCIAL

## 2023-06-01 VITALS
OXYGEN SATURATION: 97 % | DIASTOLIC BLOOD PRESSURE: 79 MMHG | HEART RATE: 75 BPM | TEMPERATURE: 98 F | RESPIRATION RATE: 16 BRPM | SYSTOLIC BLOOD PRESSURE: 131 MMHG | BODY MASS INDEX: 35.31 KG/M2 | HEIGHT: 68 IN | WEIGHT: 233 LBS

## 2023-06-01 PROCEDURE — 20610 DRAIN/INJ JOINT/BURSA W/O US: CPT | Mod: 50

## 2023-06-01 RX ORDER — HYALURONATE SODIUM 20 MG/2 ML
20 SYRINGE (ML) INTRAARTICULAR
Qty: 0 | Refills: 0 | Status: COMPLETED | OUTPATIENT
Start: 2023-06-01

## 2023-06-01 RX ADMIN — Medication 0 MG/2ML: at 00:00

## 2023-06-01 NOTE — ASSESSMENT
[FreeTextEntry1] : Patient receives 3rd round of Euflexxa to bilateral knees:\par Patient will benefit from continued physical therapy to help with joint mobility and muscle strengthening.  Encouraged to hydrate and minimal weight bearing for the next 24 hours along with ice application 20 mins on/ 20 mins off.  She is in agreement with the above plan and will return in 3 months' time.\par

## 2023-06-09 ENCOUNTER — APPOINTMENT (OUTPATIENT)
Dept: PAIN MANAGEMENT | Facility: CLINIC | Age: 53
End: 2023-06-09
Payer: COMMERCIAL

## 2023-06-09 VITALS — HEIGHT: 68 IN | WEIGHT: 233 LBS | BODY MASS INDEX: 35.31 KG/M2

## 2023-06-09 PROCEDURE — 99214 OFFICE O/P EST MOD 30 MIN: CPT | Mod: 25

## 2023-06-09 PROCEDURE — 99213 OFFICE O/P EST LOW 20 MIN: CPT | Mod: 25

## 2023-06-09 PROCEDURE — 20552 NJX 1/MLT TRIGGER POINT 1/2: CPT

## 2023-06-09 PROCEDURE — J3490M: CUSTOM

## 2023-06-09 NOTE — HISTORY OF PRESENT ILLNESS
[Lower back] : lower back [9] : 9 [7] : 7 [Dull/Aching] : dull/aching [Throbbing] : throbbing [Tightness] : tightness [Constant] : constant [Household chores] : household chores [Leisure] : leisure [Social interactions] : social interactions [Meds] : meds [Bending forward] : bending forward [Extending back] : extending back [] : no [FreeTextEntry1] : B/L KNEES

## 2023-06-09 NOTE — DISCUSSION/SUMMARY
[de-identified] : After discussing various treatment options with the patient including but not limited to oral medications, physical therapy, exercise, modalities as well as interventional spinal injections, we have decided with the following plan: \par \par Cervical Radiculopathy: \par - pharmacological: \par   - c/w diclofenac (can consider NAVDEEP-said) \par   - c/w tizanidine 4mg TID prn, \par   - c/w gabapentin 300mg BID \par - Imaging: XR C-spine  5/27/23 reviewed. MRI C-spine ordered. \par - Interventional: \par   - consider TIFFANIE after MRI\par - Rehabilitative: c/w HEP/PT\par \par Myofascial Pain: \par - pharmacological agents as above\par - performed TPI to left Trapezius at this visit. Procedure explained using an anatomical model. Risks and benefits explained to patient who verbalized understanding, including increased risk of infection, bleeding, nerve injury. \par - c/w HEP/PT\par \par Knee OA: \par - c/w HA injections with Dr. JABARI Reveles\par - consider genicular nerve block if HA loses efficacy\par - c/w HEP/PT\par \par Sacroiliitis: \par - c/w pharmacological agents as above\par - consider SIJ injection if pain worsens\par \par Lumbar Spondylosis: \par - minimal benefit from 1st diagnostic right then left L2 - L5 medial branch block. \par - c/w HEP/PT \par \par Yury Reveles MD\par

## 2023-06-09 NOTE — ASSESSMENT
[FreeTextEntry1] : 51F w/ HLA B-27, multi-joint disease, low back pain from ankylosing spondylitis, sacroiliitis, and neck pain due to myofascial syndrome. The differential includes cervical radiculopathy, and a component of her pain may orignate from cervical facets. \par \par

## 2023-06-09 NOTE — PROCEDURE
[FreeTextEntry3] : \par Trigger Point Procedure Note\par Procedure: trigger point injections of left trapezius\par Diagnosis: myofascial pain\par \par After obtaining informed consent and performing a time out, the area overlying the appropriate muscles was prepped and draped in sterile fashion. Bupivacaine 0.25% 8mL + kenalog 10mg  was injected at 1 trigger point area through a 25G needle after negative aspiration for heme. Bandaids applied to injection sites. No complications. \par \par Remainder to bupivacaine 30ml vial wasted.\par

## 2023-06-09 NOTE — PHYSICAL EXAM
[de-identified] : Gen: NAD\par Psych: mood appropriate for given condition\par Skin: intact\par Sensation: decreased to light touch over R/L arm, intact to lt touch bilaterally in c4-t1 \par Reflexes: 2+ b/l BR, Bicep, BR and patella Murray negative\par ROM: Cervical ROM full, shoulder, elbow and wrist ROM full, \par Special tests: Harrison's positive bilaterally, Hawkin's negative bilaterally, neg Empty can, neg Neer's. Spurling's pos on the R/L\par Palpation: tender cervical paraspinals, levator scapula and +taught band of muscle causing referred pain upon palpation of the left trapezius, non tender bicipital tendon\par Motor:\par 				Right	Left	\par C4	Shoulder Abduction	 5	 5	\par C5	Elbow Flexion  		 5	 5	\par C6	Wrist Extension		 5	 5	\par C7	Elbow Extension 	 5	 5	\par C8/T1	Hand Intrinsics 		 5	 5	\par C8	First Dorsal Interosseus	 5	 5	\par C8	Abductor Pollicus Brevis	 5	 5	\par C5/6	Shoulder ER		 5	 5	\par \par

## 2023-06-14 ENCOUNTER — NON-APPOINTMENT (OUTPATIENT)
Age: 53
End: 2023-06-14

## 2023-06-23 ENCOUNTER — APPOINTMENT (OUTPATIENT)
Dept: PAIN MANAGEMENT | Facility: CLINIC | Age: 53
End: 2023-06-23
Payer: COMMERCIAL

## 2023-06-23 PROCEDURE — 20552 NJX 1/MLT TRIGGER POINT 1/2: CPT

## 2023-06-23 PROCEDURE — 99214 OFFICE O/P EST MOD 30 MIN: CPT | Mod: 25

## 2023-06-23 PROCEDURE — 99213 OFFICE O/P EST LOW 20 MIN: CPT | Mod: 25

## 2023-06-23 PROCEDURE — J3490M: CUSTOM

## 2023-06-23 NOTE — ASSESSMENT
[FreeTextEntry1] : 51F w/ HLA B-27, multi-joint disease, low back pain from ankylosing spondylitis, sacroiliitis, and neck pain due to myofascial syndrome. The differential includes cervical radiculopathy. \par \par

## 2023-06-23 NOTE — PROCEDURE
[FreeTextEntry3] : \par Trigger Point Procedure Note\par Procedure: trigger point injections of left trapezius and left rhomboid\par Diagnosis: myofascial pain\par \par After obtaining informed consent and performing a time out, the area overlying the appropriate muscles was prepped and draped in sterile fashion. Bupivacaine 0.25% 8mL + kenalog 10mg  was injected at 1 trigger point area through a 25G needle after negative aspiration for heme. Bandaids applied to injection sites. No complications. \par \par Remainder to bupivacaine 30ml vial wasted.\par

## 2023-06-23 NOTE — DISCUSSION/SUMMARY
[de-identified] : After discussing various treatment options with the patient including but not limited to oral medications, physical therapy, exercise, modalities as well as interventional spinal injections, we have decided with the following plan: \par \par Cervical Radiculopathy: \par - pharmacological: \par   - c/w diclofenac (can consider NAVDEEP-said) \par   - c/w tizanidine 4mg TID prn, \par   - c/w gabapentin 300mg BID \par - Imaging: XR C-spine  5/27/23 reviewed. MRI C-spine 6/14/2023 reviewed\par - Interventional: \par   - will hold off on TIFFANIE for now \par - Rehabilitative: c/w HEP/PT\par \par Myofascial Pain: \par - pharmacological agents as above\par - performed TPI to left Trapezius and left rhomboid at this visit. Procedure explained using an anatomical model. Risks and benefits explained to patient who verbalized understanding, including increased risk of infection, bleeding, nerve injury. \par - c/w HEP/PT\par - encouraged massage therapy\par \par Knee OA: \par - c/w HA injections with Dr. JABARI Reveles\par - consider genicular nerve block if HA loses efficacy\par - c/w HEP/PT\par \par Sacroiliitis: \par - c/w pharmacological agents as above\par - consider SIJ injection if pain worsens\par \par Yury Reveles MD\par \par TPI\par \par Indications for a trigger point injection for this specific patient include the following \par \par - this is an subsequent trigger point \par - symptoms have been present for greater than 3 months \par - trigger points are provided as part of a comprehensive pain management program including physical therapy, patient education, psychosocial support, and oral medication where appropriate.\par - Distribution pattern of pain consistent with the referral pattern of the trigger points\par - Restriction of range of motion with increased sensitivity to stretch;\par - Muscular deconditioning in the affected area\par - Focal tenderness of a trigger point\par -  Palpable taut band of muscle in which trigger point is located\par - Local taut response to snapping palpation or needle insertion\par - Reproduction of referred pain pattern upon stimulation of the trigger point\par - all repeat trigger points provided 50% relief for 6 weeks \par - prior trigger points resulted in a beneficial clinical response, including but not limited to improvement in pain, functioning and or activity tolerance\par - Trigger points facilitate mobilization and return to ADL\par - conservative therapies including pharmacotherapy such as NSAID, tylenol and muscle relaxants have been tried and failed\par - pain distribution is consistent with referral pattern of trigger point, ROM restriction, focal tenderness, palpable taught band with reproduction of pain and spot tenderness along the length of the taut band with reduction of pain with pressure.  There is also increased sensitivity to stretch \par - Also with a history of onset of the painful condition, consistent with trigger point with a local taut response to snapping palpation with reproduction of the referred pain pattern upon stimulation of the trigger point.  \par

## 2023-06-23 NOTE — PHYSICAL EXAM
[de-identified] : Gen: NAD\par Psych: mood appropriate for given condition\par Skin: intact\par Sensation: decreased to light touch over R/L arm, intact to lt touch bilaterally in c4-t1 \par Reflexes: 2+ b/l BR, Bicep, BR and patella Murray negative\par ROM: Cervical ROM full, shoulder, elbow and wrist ROM full, \par Special tests: Harrison's positive bilaterally, Hawkin's negative bilaterally, neg Empty can, neg Neer's. Spurling's pos on the R/L\par Palpation: tender cervical paraspinals, levator scapula and +taught band of muscle causing referred pain upon palpation of the left trapezius and left rhomboid, non tender bicipital tendon\par Motor:\par 				Right	Left	\par C4	Shoulder Abduction	 5	 5	\par C5	Elbow Flexion  		 5	 5	\par C6	Wrist Extension		 5	 5	\par C7	Elbow Extension 	 5	 5	\par C8/T1	Hand Intrinsics 		 5	 5	\par C8	First Dorsal Interosseus	 5	 5	\par C8	Abductor Pollicus Brevis	 5	 5	\par C5/6	Shoulder ER		 5	 5	\par \par

## 2023-07-21 ENCOUNTER — APPOINTMENT (OUTPATIENT)
Dept: PAIN MANAGEMENT | Facility: CLINIC | Age: 53
End: 2023-07-21
Payer: COMMERCIAL

## 2023-07-21 VITALS — WEIGHT: 233 LBS | BODY MASS INDEX: 35.31 KG/M2 | HEIGHT: 68 IN

## 2023-07-21 DIAGNOSIS — M79.18 MYALGIA, OTHER SITE: ICD-10-CM

## 2023-07-21 PROCEDURE — 99214 OFFICE O/P EST MOD 30 MIN: CPT

## 2023-07-21 NOTE — PHYSICAL EXAM
[de-identified] : Gen: NAD\par Psych: mood appropriate for given condition\par Skin: intact\par Sensation: decreased to light touch over R/L arm, intact to lt touch bilaterally in c4-t1 \par Reflexes: 2+ b/l BR, Bicep, BR and patella Murray negative\par ROM: Cervical ROM full, shoulder, elbow and wrist ROM full, \par Special tests: Harrison's positive bilaterally, Hawkin's negative bilaterally, neg Empty can, neg Neer's. Spurling's pos on the R/L\par Palpation: tender cervical paraspinals, levator scapula and +taught band of muscle causing referred pain upon palpation of the left trapezius and left rhomboid, non tender bicipital tendon\par Motor:\par 				Right	Left	\par C4	Shoulder Abduction	 5	 5	\par C5	Elbow Flexion  		 5	 5	\par C6	Wrist Extension		 5	 5	\par C7	Elbow Extension 	 5	 5	\par C8/T1	Hand Intrinsics 		 5	 5	\par C8	First Dorsal Interosseus	 5	 5	\par C8	Abductor Pollicus Brevis	 5	 5	\par C5/6	Shoulder ER		 5	 5	\par \par

## 2023-07-21 NOTE — ASSESSMENT
[FreeTextEntry1] : 51F w/ HLA B-27, multi-joint disease, low back pain from ankylosing spondylitis, sacroiliitis, and neck pain due to myofascial syndrome and cervical radiculopathy. \par \par

## 2023-07-21 NOTE — DISCUSSION/SUMMARY
[de-identified] : After discussing various treatment options with the patient including but not limited to oral medications, physical therapy, exercise, modalities as well as interventional spinal injections, we have decided with the following plan: \par \par Cervical Radiculopathy: \par - pharmacological: \par   - c/w diclofenac (can consider NAVDEEP-said) \par   - refilled tizanidine 4mg TID prn, \par   - c/w gabapentin 300mg BID \par - Imaging: XR C-spine  5/27/23 reviewed. MRI C-spine 6/14/2023 reviewed\par - Interventional: \par   - schedule for C7/T1 TIFFANIE. Pt will call. Procedure explained using an anatomical model. Risks and benefits explained to patient who verbalized understanding, including increased risk of infection, bleeding, nerve injury. \par - Rehabilitative: c/w HEP/PT\par \par Myofascial Pain: \par - pharmacological agents as above\par - can repeat TPI to left Trapezius and left rhomboid if pain worsens. \par - c/w HEP/PT\par - encouraged massage therapy\par \par Knee OA: \par - c/w HA injections with Dr. JABARI Reveles\par - consider genicular nerve block if HA loses efficacy\par - c/w HEP/PT\par \par Sacroiliitis: \par - c/w pharmacological agents as above\par - consider SIJ injection if pain worsens\par \par Yury Reveles MD\par \par \par \par

## 2023-09-14 ENCOUNTER — APPOINTMENT (OUTPATIENT)
Dept: RHEUMATOLOGY | Facility: CLINIC | Age: 53
End: 2023-09-14
Payer: COMMERCIAL

## 2023-09-14 DIAGNOSIS — M54.12 RADICULOPATHY, CERVICAL REGION: ICD-10-CM

## 2023-09-14 PROCEDURE — 99214 OFFICE O/P EST MOD 30 MIN: CPT

## 2023-09-14 RX ORDER — CYCLOBENZAPRINE HYDROCHLORIDE 10 MG/1
10 TABLET, FILM COATED ORAL
Qty: 30 | Refills: 5 | Status: DISCONTINUED | COMMUNITY
Start: 2020-09-03 | End: 2023-09-14

## 2023-09-15 PROBLEM — M54.12 CERVICAL RADICULOPATHY: Status: ACTIVE | Noted: 2023-06-09

## 2023-09-15 RX ORDER — HYALURONATE SODIUM 20 MG/2 ML
20 SYRINGE (ML) INTRAARTICULAR
Qty: 2 | Refills: 0 | Status: ACTIVE | COMMUNITY
Start: 2022-08-18 | End: 1900-01-01

## 2023-10-02 LAB
25(OH)D3 SERPL-MCNC: 25.2 NG/ML
ALBUMIN SERPL ELPH-MCNC: 4.4 G/DL
ALP BLD-CCNC: 105 U/L
ALT SERPL-CCNC: 31 U/L
ANION GAP SERPL CALC-SCNC: 11 MMOL/L
AST SERPL-CCNC: 23 U/L
BILIRUB SERPL-MCNC: 0.8 MG/DL
BUN SERPL-MCNC: 11 MG/DL
CALCIUM SERPL-MCNC: 9.2 MG/DL
CHLORIDE SERPL-SCNC: 106 MMOL/L
CO2 SERPL-SCNC: 26 MMOL/L
CREAT SERPL-MCNC: 0.61 MG/DL
CREAT SPEC-SCNC: 39 MG/DL
CREAT/PROT UR: NORMAL RATIO
CRP SERPL-MCNC: <3 MG/L
EGFR: 109 ML/MIN/1.73M2
ERYTHROCYTE [SEDIMENTATION RATE] IN BLOOD BY WESTERGREN METHOD: 11 MM/HR
ESTIMATED AVERAGE GLUCOSE: 120 MG/DL
GLUCOSE SERPL-MCNC: 103 MG/DL
HBA1C MFR BLD HPLC: 5.8 %
HCT VFR BLD CALC: 38 %
HGB BLD-MCNC: 12.9 G/DL
MCHC RBC-ENTMCNC: 32.1 PG
MCHC RBC-ENTMCNC: 33.9 GM/DL
MCV RBC AUTO: 94.5 FL
PLATELET # BLD AUTO: 296 K/UL
POTASSIUM SERPL-SCNC: 4 MMOL/L
PROT SERPL-MCNC: 7.2 G/DL
PROT UR-MCNC: <4 MG/DL
RBC # BLD: 4.02 M/UL
RBC # FLD: 13 %
SODIUM SERPL-SCNC: 143 MMOL/L
TSH SERPL-ACNC: 1.04 UIU/ML
WBC # FLD AUTO: 6.17 K/UL

## 2023-10-03 LAB
M TB IFN-G BLD-IMP: NEGATIVE
QUANTIFERON TB PLUS MITOGEN MINUS NIL: 4.63 IU/ML
QUANTIFERON TB PLUS NIL: 0.03 IU/ML
QUANTIFERON TB PLUS TB1 MINUS NIL: 0 IU/ML
QUANTIFERON TB PLUS TB2 MINUS NIL: 0 IU/ML

## 2023-10-16 ENCOUNTER — RX RENEWAL (OUTPATIENT)
Age: 53
End: 2023-10-16

## 2023-12-05 RX ORDER — HYALURONATE SODIUM 20 MG/2 ML
20 SYRINGE (ML) INTRAARTICULAR
Qty: 6 | Refills: 0 | Status: ACTIVE | COMMUNITY
Start: 2023-03-31

## 2023-12-07 ENCOUNTER — APPOINTMENT (OUTPATIENT)
Dept: RHEUMATOLOGY | Facility: CLINIC | Age: 53
End: 2023-12-07
Payer: COMMERCIAL

## 2023-12-07 VITALS
HEIGHT: 68 IN | BODY MASS INDEX: 35.77 KG/M2 | RESPIRATION RATE: 16 BRPM | OXYGEN SATURATION: 97 % | HEART RATE: 80 BPM | TEMPERATURE: 98 F | WEIGHT: 236 LBS | SYSTOLIC BLOOD PRESSURE: 118 MMHG | DIASTOLIC BLOOD PRESSURE: 74 MMHG

## 2023-12-07 PROCEDURE — 20610 DRAIN/INJ JOINT/BURSA W/O US: CPT | Mod: 50

## 2023-12-07 RX ORDER — HYALURONATE SODIUM 20 MG/2 ML
20 SYRINGE (ML) INTRAARTICULAR
Qty: 0 | Refills: 0 | Status: COMPLETED | OUTPATIENT
Start: 2023-12-07

## 2023-12-07 RX ADMIN — Medication 0 MG/2ML: at 00:00

## 2023-12-14 ENCOUNTER — APPOINTMENT (OUTPATIENT)
Dept: RHEUMATOLOGY | Facility: CLINIC | Age: 53
End: 2023-12-14
Payer: COMMERCIAL

## 2023-12-14 VITALS
BODY MASS INDEX: 35.77 KG/M2 | HEIGHT: 68 IN | WEIGHT: 236 LBS | RESPIRATION RATE: 16 BRPM | DIASTOLIC BLOOD PRESSURE: 77 MMHG | HEART RATE: 77 BPM | OXYGEN SATURATION: 98 % | SYSTOLIC BLOOD PRESSURE: 133 MMHG | TEMPERATURE: 98 F

## 2023-12-14 PROCEDURE — 20610 DRAIN/INJ JOINT/BURSA W/O US: CPT | Mod: 50

## 2023-12-14 RX ORDER — HYALURONATE SODIUM 20 MG/2 ML
20 SYRINGE (ML) INTRAARTICULAR
Qty: 0 | Refills: 0 | Status: COMPLETED | OUTPATIENT
Start: 2023-12-14

## 2023-12-14 RX ADMIN — Medication 0 MG/2ML: at 00:00

## 2023-12-14 NOTE — ASSESSMENT
[FreeTextEntry1] : Patient receives Euflexxa #2 of 3 series :  Patient will benefit from continued physical therapy to help with joint mobility and muscle strengthening.  Encouraged to hydrate and minimal weight bearing for the next 24 hours along with ice application 20 mins on/ 20 mins off.  She is in agreement with the above plan and will return in one weeks' time.

## 2023-12-21 ENCOUNTER — APPOINTMENT (OUTPATIENT)
Dept: RHEUMATOLOGY | Facility: CLINIC | Age: 53
End: 2023-12-21
Payer: COMMERCIAL

## 2023-12-21 VITALS
HEART RATE: 84 BPM | SYSTOLIC BLOOD PRESSURE: 130 MMHG | BODY MASS INDEX: 35.77 KG/M2 | HEIGHT: 68 IN | RESPIRATION RATE: 16 BRPM | DIASTOLIC BLOOD PRESSURE: 84 MMHG | TEMPERATURE: 98.6 F | OXYGEN SATURATION: 98 % | WEIGHT: 236 LBS

## 2023-12-21 DIAGNOSIS — M17.0 BILATERAL PRIMARY OSTEOARTHRITIS OF KNEE: ICD-10-CM

## 2023-12-21 PROCEDURE — 20610 DRAIN/INJ JOINT/BURSA W/O US: CPT | Mod: 50

## 2023-12-21 RX ORDER — HYALURONATE SODIUM 20 MG/2 ML
20 SYRINGE (ML) INTRAARTICULAR
Qty: 0 | Refills: 0 | Status: COMPLETED | OUTPATIENT
Start: 2023-12-21

## 2023-12-21 RX ADMIN — Medication 0 MG/2ML: at 00:00

## 2023-12-21 NOTE — ASSESSMENT
[FreeTextEntry1] : Patient receives Euflexxa #3 of 3 series :  Patient will benefit from continued physical therapy to help with joint mobility and muscle strengthening.  Encouraged to hydrate and minimal weight bearing for the next 24 hours along with ice application 20 mins on/ 20 mins off.  She is in agreement with the above plan and will return in three months' time.

## 2024-03-12 ENCOUNTER — APPOINTMENT (OUTPATIENT)
Dept: PULMONOLOGY | Facility: CLINIC | Age: 54
End: 2024-03-12

## 2024-04-11 ENCOUNTER — APPOINTMENT (OUTPATIENT)
Dept: RHEUMATOLOGY | Facility: CLINIC | Age: 54
End: 2024-04-11
Payer: COMMERCIAL

## 2024-04-11 VITALS
OXYGEN SATURATION: 98 % | WEIGHT: 242 LBS | RESPIRATION RATE: 16 BRPM | HEART RATE: 91 BPM | BODY MASS INDEX: 36.68 KG/M2 | DIASTOLIC BLOOD PRESSURE: 79 MMHG | HEIGHT: 68 IN | SYSTOLIC BLOOD PRESSURE: 137 MMHG | TEMPERATURE: 98.4 F

## 2024-04-11 DIAGNOSIS — M25.541 PAIN IN JOINTS OF RIGHT HAND: ICD-10-CM

## 2024-04-11 DIAGNOSIS — M25.542 PAIN IN JOINTS OF RIGHT HAND: ICD-10-CM

## 2024-04-11 DIAGNOSIS — M47.817 SPONDYLOSIS W/OUT MYELOPATHY OR RADICULOPATHY, LUMBOSACRAL REGION: ICD-10-CM

## 2024-04-11 DIAGNOSIS — M72.2 PLANTAR FASCIAL FIBROMATOSIS: ICD-10-CM

## 2024-04-11 PROCEDURE — 99214 OFFICE O/P EST MOD 30 MIN: CPT

## 2024-04-11 PROCEDURE — G2211 COMPLEX E/M VISIT ADD ON: CPT

## 2024-04-16 ENCOUNTER — APPOINTMENT (OUTPATIENT)
Dept: PULMONOLOGY | Facility: CLINIC | Age: 54
End: 2024-04-16
Payer: COMMERCIAL

## 2024-04-16 ENCOUNTER — LABORATORY RESULT (OUTPATIENT)
Age: 54
End: 2024-04-16

## 2024-04-16 VITALS
WEIGHT: 242 LBS | RESPIRATION RATE: 16 BRPM | TEMPERATURE: 98.2 F | HEIGHT: 68 IN | DIASTOLIC BLOOD PRESSURE: 74 MMHG | SYSTOLIC BLOOD PRESSURE: 122 MMHG | HEART RATE: 76 BPM | BODY MASS INDEX: 36.68 KG/M2 | OXYGEN SATURATION: 98 %

## 2024-04-16 LAB
APPEARANCE: CLEAR
BILIRUBIN URINE: NEGATIVE
BLOOD URINE: NEGATIVE
COLOR: YELLOW
ERYTHROCYTE [SEDIMENTATION RATE] IN BLOOD BY WESTERGREN METHOD: 52 MM/HR
GLUCOSE QUALITATIVE U: NEGATIVE MG/DL
HCT VFR BLD CALC: 37.3 %
HGB BLD-MCNC: 12.5 G/DL
KETONES URINE: NEGATIVE MG/DL
LEUKOCYTE ESTERASE URINE: ABNORMAL
MCHC RBC-ENTMCNC: 30 PG
MCHC RBC-ENTMCNC: 33.5 GM/DL
MCV RBC AUTO: 89.7 FL
NITRITE URINE: NEGATIVE
PH URINE: 5.5
PLATELET # BLD AUTO: 342 K/UL
PROTEIN URINE: NEGATIVE MG/DL
RBC # BLD: 4.16 M/UL
RBC # FLD: 13.2 %
SPECIFIC GRAVITY URINE: 1.02
UROBILINOGEN URINE: 1 MG/DL
WBC # FLD AUTO: 5.63 K/UL

## 2024-04-16 PROCEDURE — 94010 BREATHING CAPACITY TEST: CPT

## 2024-04-16 PROCEDURE — 99205 OFFICE O/P NEW HI 60 MIN: CPT | Mod: 25

## 2024-04-16 PROCEDURE — ZZZZZ: CPT

## 2024-04-16 PROCEDURE — 94729 DIFFUSING CAPACITY: CPT

## 2024-04-16 PROCEDURE — 94726 PLETHYSMOGRAPHY LUNG VOLUMES: CPT

## 2024-04-16 NOTE — HISTORY OF PRESENT ILLNESS
[FreeTextEntry1] : Patient returns for follow-up and explains moderate improvement in hip and lumbar tightness , increasing LE mobility, with the use of Adalimumab.  She finds lower back and knee pain are intermittent though of late finds most notable is a tightening sensation in the hands and feet.  She has tried modification of footwear as well as continues posterior chain strengthening exercises for plantar fasciitis.  She does note hands with similar sensation with subjective swelling, she cannot recall any trauma or inciting factors.  Upon additional query she does note an increase in salt intake for snacks before going to bed.   As for the lower back, she has tried nerve block to L3-S1 with minimal relief however finds the titration of gabapentin has improved lower extremity discomfort, pain management guidance appreciated.  Additional trigger point injections for cervical radiculopathy administered by pain colleagues;  patient takes tizanidine 8 mg intermittently though does report lethargy accompanying tx. Patient seen by vascular colleagues, consult appreciated and recommended to wear compression stockings for varicosities. Hip and spine and hip films reflect DJD changes ; SI joint without erosive sequela.  MR imaging performed in October 2021 reflects mild multilevel annular bulge/ osteophyte formation most notable at L4-5 and L5-S1 without foraminal stenosis with endplate marrow signal changes at L4-5.  Patient with  testing reflecting HLA-B27 positivity; patient with quelled sedimentation rate otherwise normal markers.   Blood testing reflecting normal surveillance labs including QF gold testing and hepatitis testing.  Prior IgG+ C. Trachomatis noted. Patient had a fleeting low titer CCP antibody in the past as well as a low titer CAITIE of 1:80 nucleolar pattern. Many encounters ago, the patient had a psoriatic patch over the elbow which she notes was remedied by emollients application.  She denies photosensitivity or Raynauds.  She further denies visual/ GI disturbances, oral ulcers, dyspnea, chest pain, motor disturbances.

## 2024-04-16 NOTE — CONSULT LETTER
[Dear  ___] : Dear  [unfilled], [Courtesy Letter:] : I had the pleasure of seeing your patient, [unfilled], in my office today. [Please see my note below.] : Please see my note below. [Consult Closing:] : Thank you very much for allowing me to participate in the care of this patient.  If you have any questions, please do not hesitate to contact me. [Sincerely,] : Sincerely, [FreeTextEntry2] : Winsome Garcia MD  [FreeTextEntry3] : Yamileth Reveles M.D.\par   of Medicine \par  [employer] [facility]\par  \par

## 2024-04-16 NOTE — HISTORY OF PRESENT ILLNESS
[TextBox_4] : 53F with spondyloarthropathy HLA B27 positive She complains occasionally feeling chest pressure and tightness jc in the underbra distribution. She feels this often toward the end of the day and frequently at rest, it is sometimes accompanied by feeling of discomfort and feeling of incomplete inhalation. She has no sob or carvajal, has good exercise tolerance, no chest pain and no cough, no phlegm and no wheezing. She has no h/o asthma but has wheezed in past in association with seasonal allergies and this is controlled when allergies are controlled. She does endorse GERD. Lifelong nonsmoker

## 2024-04-16 NOTE — ASSESSMENT
Reviewed with pharmacy and patient to receive dex in clinic. LVM for patient with information and provided phone number with any questions.   [FreeTextEntry1] : 53F with atypical symptom of intermittent chest tightness, non-exertional with some discomfort on inhalation. This is often post-prandial. PFT normal study. Clear on exam. Symptoms not typical of any lung disorder. I would consider GERD and dyspepsia, costochondritis and anxiety and possible contributing factors. RTC prn

## 2024-04-16 NOTE — ASSESSMENT
[FreeTextEntry1] : Patient has had long-standing arthralgias, bilateral knee pain secondary to mechanical disease;  moderate improvement hip pain in the setting of HLA-B27 positivity; Planter fasciitis:  Patient to c/w TNF-alpha inhibitor for a spondyloarthropathy presentation.  Discussed increased risk of serious infections related to biologic therapy and the importance of regular vaccinations.  Patient understands increased risk of cardiovascular disease in the setting of inflammatory arthritis and the importance of exercise modification; patient to look into gym and start cycling versus treadmill so as not to worsen planter fasciitis at this time.  Posterior chain strengthening encouraged as well as appropriate footwear. Patient will benefit from pain and physical therapy to help with joint mobility and muscle strengthening; requisition given.  Quadriceps strengthening exercises demonstrated in the office.  Weight loss has been encouraged to reduce load over the medial joint line.  Viscosupplementation has been encouraged to provide additional lubrication and joint support.   In the interim, Diclofenac gel has been rec.  Core strengthening and exercise modification emphasized. Neuropathic pain therapy and muscle relaxants continued. Dietary and exercise modification discussed in detail.  Discussed salt restriction most notably towards the second half of the day and to further assess swelling sensation in the morning.  She is in agreement with the above plan and will return in two months' time.

## 2024-04-16 NOTE — CONSULT LETTER
Family Medicine Daily Progress Note    Patient Name: Doug Saeed   Date: 04/18/22    SUBJECTIVE:     NAEO. Weights still not being taken. Output documented as 6.68L since admission. 1200cc in last 24 hours. IV Lasix stopped, oral 40mg lasixstarted, and pt on spironolactone 25mg now  Entresto started yesterday  AST now normalized. ALT downtrending. T bili stable from yesterday 1.6->1.7    Denies SOB, CP, abdominal pain  Saturating well on RA      OBJECTIVE:     Last Recorded Vitals:  Vitals with min/max:  Vital Last Value 24 Hour Range   Temperature 97.5 Â°F (36.4 Â°C) (04/18/22 0741) Temp  Min: 97.5 Â°F (36.4 Â°C)  Max: 97.7 Â°F (36.5 Â°C)   Pulse 78 (04/18/22 0741) Pulse  Min: 74  Max: 80   Respiratory 16 (04/18/22 0741) Resp  Min: 16  Max: 18   Non-Invasive  Blood Pressure 139/85 (04/18/22 0741) BP  Min: 115/72  Max: 139/85   Pulse Oximetry 94 % (04/18/22 0741) SpO2  Min: 94 %  Max: 98 %   Arterial   Blood Pressure   No data recorded      Intake/Output:    Intake/Output Summary (Last 24 hours) at 4/18/2022 0759  Last data filed at 4/18/2022 0614  Gross per 24 hour   Intake --   Output 1200 ml   Net -1200 ml       Physical Exam     Visit Vitals  /85 (BP Location: RUE - Right upper extremity, Patient Position: Sitting)   Pulse 78   Temp 97.5 Â°F (36.4 Â°C) (Oral)   Resp 16   SpO2 94%     Visit Vitals  /85 (BP Location: RUE - Right upper extremity, Patient Position: Sitting)   Pulse 78   Temp 97.5 Â°F (36.4 Â°C) (Oral)   Resp 16   SpO2 94%       General Appearance:    Alert, cooperative, no distress, appears stated age. More alert and oriented compared to yesterday.    Head:    Normocephalic, without obvious abnormality, atraumatic   Eyes:    Pupils equal, round, and reactive to light, conjunctivae/corneas clear, extraocular movements intact, fundi benign, both eyes   Ears:    Normal tympanic membranes and external ear canals, both ears   Nose:   Nares normal, septum midline, mucosa normal, no drainage or sinus tenderness   Throat:   Lips, mucosa, and tongue normal; teeth and gums normal   Neck:   Supple, symmetrical, trachea midline, no adenopathy;     thyroid:  no enlargement/tenderness/nodules; no carotid    bruit or jugular venous distention   Back:     Symmetric, no curvature, range of motion normal, no costovertebral angle tenderness   Lungs:     Clear to auscultation bilaterally, respirations unlabored   Chest Wall:    No tenderness or deformity    Heart:    Regular rate and rhythm, S1 and S2 normal, no murmur, rub or gallop   Abdomen:     Soft, nontender, bowel sounds active all four quadrants,     no masses, no organomegaly   Extremities:   Extremities normal, atraumatic, no cyanosis or edema   Pulses:   2+ and symmetric all extremities   Skin:   Skin color, texture, turgor normal, no rashes or lesions   Lymph nodes:   Cervical, supraclavicular, and axillary nodes normal   Neurologic:   Cranial nerves II-XII intact, normal strength, sensation and reflexes throughout         Labs:     Recent Labs   Lab 04/18/22  0621 04/17/22  0627 04/16/22  0615 04/15/22  0600 04/14/22  1037   HGB 13.6 12.9* 12.3* 11.7* 12.5*   HCT 45.3 42.8 39.9 38.2* 40.7    284 262 251 308   WBC 8.7 8.5 8.9 7.8 8.7       Recent Labs   Lab 04/18/22  0621 04/17/22  0627 04/16/22  0615   SODIUM 140 142 142   POTASSIUM 4.1 3.9 3.5   CHLORIDE 106 108* 106   CO2 29 30 31   BUN 28* 28* 35*   CREATININE 1.19* 1.15 1.19*   CALCIUM 9.3 9.3 9.2   ALBUMIN 2.8* 2.7* 2.8*   BILIRUBIN 1.7* 1.6* 1.4*   ALKPT 85 83 83   * 166* 234*   AST 32 49* 84*   GLUCOSE 102* 102* 90   MG 2.4 2.2 2.1   PHOS 2.7 2.6 3.2       Lab Results Reviewed    Cultures:   No results found for: BLOODCULTURE    Microbiology:  No results found for: BLOODCULTURE    Imaging:    XR CHEST PA OR AP 1 VIEW   Final Result   1. Pulmonary edema. Electronically Signed by: Star Chavez M.D.     Signed on: 4/16/2022 2:34 PM          CT CHEST ABDOMEN PELVIS WO CONTRAST Final Result   1. Worsening pleural effusion with associated compressive   atelectasis/consolidation. Superimposed findings of possible pulmonary   edema versus inflammatory changes, worst in the perihilar lung zones and   lung bases. 2.  Redemonstrated urinary bladder wall thickening. Redemonstrated L1   vertebral sclerotic lesion, possible bone island. 3.  Moderate stool in colon. Electronically Signed by: Leopoldo Endo, M.D. Signed on: 4/14/2022 7:19 PM          CT HEAD WO CONTRAST   Final Result   1. There are no acute intracranial abnormalities. Age-appropriate cerebral   and cerebellar atrophy are noted with chronic supratentorial white matter   ischemic demyelination from small vessel atherosclerotic disease. Electronically Signed by: Nicki Wong M.D. Signed on: 4/14/2022 2:24 PM          US LIVER GALLBLADDER PANCREAS   Final Result       Gallbladder polyp. Electronically Signed by: Dustin Vallejo MD    Signed on: 4/14/2022 2:19 PM          XR CHEST PA AND LATERAL 2 VIEWS   Final Result   New/increased mild bibasilar atelectasis. Small left pleural effusion. Superimposed pulmonary edema is possible.       Electronically Signed by: Joanne English MD    Signed on: 4/14/2022 11:20 AM          MRI CARDIAC MORPHOLOGY AND FUNCTION W WO CONTRAST    (Results Pending)       Inpatient Medications  â¢ spironolactone  25 mg Oral Daily   â¢ sacubitril-valsartan  1 tablet Oral BID   â¢ atorvastatin  20 mg Oral QHS   â¢ apixaBAN  5 mg Oral Q12H   â¢ metoPROLOL succinate  100 mg Oral BID   â¢ sodium chloride (PF)  2 mL Intracatheter 2 times per day   â¢ finasteride  5 mg Oral Q Evening   â¢ insulin NPH  6 Units Subcutaneous BID   â¢ tamsulosin  0.4 mg Oral Daily PC   â¢ insulin lispro   Subcutaneous TID WC   â¢ nystatin   Topical TID       PRN Meds:  lidocaine 2% urethral, sodium chloride, dextrose, dextrose, glucagon, dextrose, dextrose    ASSESSMENT AND PLAN:     Tim is a 79 year [Dear  ___] : Dear  [unfilled], [Consult Letter:] : I had the pleasure of evaluating your patient, [unfilled]. oldÂ maleÂ with pastÂ medical history of DVT, A fib on Eliquis, sick sinus syndromeÂ s/p pacemaker (12/10/21),Â HTN, HLD, IDDM, cellulitis, and BPH presenting with nausea and weakness with concerns for CHF exacerbation, now showing significant improvement with diuretics. #CHF exacerbation  #Atrial fibrillation on Eliquis  #HTN  #Sick sinus syndrome s/p BiV pacemaker  #Elevated troponin  -TTE 12/18/21: EF 38%, demonstrated new pericardial effusion as well as LV dysfunction. -TTE 4/14: EF 44%. Grade 2 diastolic dysfunction. Aortic regurg, mild mitral valve regurg.  -Cardiology consulted, recs appreciated  -Telemetry  -Daily weights, strict I/Os  -Entresto 24-26 mg BID  -Continue home metoprolol succinate 100 mg twice daily  -Continue Eliquis 5 mg twice daily  -Continue spironolactone 25 mg daily per cardiology  -Cardiac MRI scheduled for tomorrow. Pacemaker will need to be adjusted prior to imaging. #Congestive Hepatopathy  #Nonlocalizing encephalopathy, likely 2/2 above, improving  #Elevated AST and ALT, likely 2/2 above, improving  #Hx of sclerotic lesion in L1 vertabral body  -CT CAP 4/14 unremarkable for metastatic lesions in the liver. Sclerotic lesion on L1 once again noted. Of particular concern there is mediastinal lymphadenopathy that may be secondary to acute infection but may still be a sign of malignancy. Patient will need repeat CT and radionuclide bone scan after discharge to follow these up.  -Labs largely unremarkable and unconcerning for hepatic malignancy  -Encephalopathy improved significantly with diuresis. #FRANK, improved  -Continue IV lasix  -Avoid nephrotoxic agents  -CTM with daily labs  Â   #BPH  #Urinary retention   -s/p TURP  -Continue finasteride 5 mg daily  -Continue Flomax 0.4 mg daily  -UA showed contamination, no clear signs of infection  -Void trial today. Will bladder scan at 1300.   Â   #HLD  -Resumed Lipitor 20 mg nightly  Â   #Chronic anemia  -Prior work-up inpatient showed [Please see my note below.] : Please see my note below. low iron, low iron percent saturation, low B12. Â Â Started on B12 outpatient.   -CTM  Â   #IDDM  #Severe protein/calorie malnutrition  -Hold Metformin  -Continue 6 units insulin NPH BID.              -Receives 8 units insulin NPH twice daily at home   -medium doseÂ SSI  Â   Fluids: None  Electrolytes: Replete PRN  Nutrition: Cardiac/renal/diabetic diet  DVT PPx: apixaBAN - 5 MG  Eliquis Tab - 5 MG  GI PPx: None  Code Status: Full code  Consults:   IP Consult Orders (From admission, onward)             Start     Ordered    04/14/22 1223  Inpatient consult to Cardiology  ONE TIME        Provider:  Britt Jensen DO    04/14/22 1222               PCP: Dr. John Emerson MD    Patient seen or discussed with attending: Dr. Alvin Lamb DO  PGY-1 Family Medicine  04/18/22 [Consult Closing:] : Thank you very much for allowing me to participate in the care of this patient.  If you have any questions, please do not hesitate to contact me. [Sincerely,] : Sincerely, [FreeTextEntry3] : Sivan Hou MD, FCCP Chief, Pulmonary Medicine [facility]  of Medicine [employer] at [facility]

## 2024-04-16 NOTE — PHYSICAL EXAM
[General Appearance - Alert] : alert [General Appearance - In No Acute Distress] : in no acute distress [Sclera] : the sclera and conjunctiva were normal [Edema] : there was no peripheral edema [No Spinal Tenderness] : no spinal tenderness [Nail Clubbing] : no clubbing  or cyanosis of the fingernails [Musculoskeletal - Swelling] : no joint swelling seen [Motor Tone] : muscle strength and tone were normal [] : no rash [Skin Lesions] : no skin lesions [Motor Exam] : the motor exam was normal [Oriented To Time, Place, And Person] : oriented to person, place, and time [Impaired Insight] : insight and judgment were intact [FreeTextEntry1] : No active synovitis ; knees with mild tenderness along the medial joint lines b/l; tender over the plantar fascia bilaterally upon forced dorsiflexion

## 2024-04-17 LAB
ALBUMIN SERPL ELPH-MCNC: 4.4 G/DL
ALP BLD-CCNC: 108 U/L
ALT SERPL-CCNC: 23 U/L
ANION GAP SERPL CALC-SCNC: 14 MMOL/L
AST SERPL-CCNC: 18 U/L
BILIRUB SERPL-MCNC: 0.9 MG/DL
BUN SERPL-MCNC: 10 MG/DL
CALCIUM SERPL-MCNC: 9.3 MG/DL
CHLORIDE SERPL-SCNC: 104 MMOL/L
CHOLEST SERPL-MCNC: 205 MG/DL
CO2 SERPL-SCNC: 23 MMOL/L
CREAT SERPL-MCNC: 0.66 MG/DL
CREAT SPEC-SCNC: 185 MG/DL
CREAT/PROT UR: 0.1 RATIO
CRP SERPL-MCNC: <3 MG/L
EGFR: 106 ML/MIN/1.73M2
ESTIMATED AVERAGE GLUCOSE: 111 MG/DL
FOLATE SERPL-MCNC: 10.3 NG/ML
GLUCOSE SERPL-MCNC: 90 MG/DL
HBA1C MFR BLD HPLC: 5.5 %
HDLC SERPL-MCNC: 57 MG/DL
LDLC SERPL CALC-MCNC: 124 MG/DL
NONHDLC SERPL-MCNC: 148 MG/DL
POTASSIUM SERPL-SCNC: 3.9 MMOL/L
PROT SERPL-MCNC: 7.1 G/DL
PROT UR-MCNC: 12 MG/DL
SODIUM SERPL-SCNC: 141 MMOL/L
TRIGL SERPL-MCNC: 131 MG/DL
TSH SERPL-ACNC: 1.19 UIU/ML
URATE SERPL-MCNC: 4.4 MG/DL
VIT B12 SERPL-MCNC: 336 PG/ML

## 2024-05-31 ENCOUNTER — APPOINTMENT (OUTPATIENT)
Dept: FAMILY MEDICINE | Facility: CLINIC | Age: 54
End: 2024-05-31
Payer: COMMERCIAL

## 2024-05-31 VITALS
OXYGEN SATURATION: 98 % | HEART RATE: 67 BPM | HEIGHT: 68 IN | BODY MASS INDEX: 36.07 KG/M2 | DIASTOLIC BLOOD PRESSURE: 89 MMHG | WEIGHT: 238 LBS | SYSTOLIC BLOOD PRESSURE: 123 MMHG | RESPIRATION RATE: 15 BRPM

## 2024-05-31 DIAGNOSIS — M25.562 PAIN IN RIGHT KNEE: ICD-10-CM

## 2024-05-31 DIAGNOSIS — F41.9 ANXIETY DISORDER, UNSPECIFIED: ICD-10-CM

## 2024-05-31 DIAGNOSIS — Z86.19 PERSONAL HISTORY OF OTHER INFECTIOUS AND PARASITIC DISEASES: ICD-10-CM

## 2024-05-31 DIAGNOSIS — M25.561 PAIN IN RIGHT KNEE: ICD-10-CM

## 2024-05-31 DIAGNOSIS — Z87.898 PERSONAL HISTORY OF OTHER SPECIFIED CONDITIONS: ICD-10-CM

## 2024-05-31 DIAGNOSIS — G89.29 PAIN IN RIGHT KNEE: ICD-10-CM

## 2024-05-31 DIAGNOSIS — Z83.438 FAMILY HISTORY OF OTHER DISORDER OF LIPOPROTEIN METABOLISM AND OTHER LIPIDEMIA: ICD-10-CM

## 2024-05-31 DIAGNOSIS — L29.9 PRURITUS, UNSPECIFIED: ICD-10-CM

## 2024-05-31 DIAGNOSIS — Z82.49 FAMILY HISTORY OF ISCHEMIC HEART DISEASE AND OTHER DISEASES OF THE CIRCULATORY SYSTEM: ICD-10-CM

## 2024-05-31 DIAGNOSIS — R68.89 OTHER GENERAL SYMPTOMS AND SIGNS: ICD-10-CM

## 2024-05-31 DIAGNOSIS — Z00.00 ENCOUNTER FOR GENERAL ADULT MEDICAL EXAMINATION W/OUT ABNORMAL FINDINGS: ICD-10-CM

## 2024-05-31 DIAGNOSIS — R05.8 OTHER SPECIFIED COUGH: ICD-10-CM

## 2024-05-31 DIAGNOSIS — Z15.89 GENETIC SUSCEPTIBILITY TO OTHER DISEASE: ICD-10-CM

## 2024-05-31 DIAGNOSIS — Z13.820 ENCOUNTER FOR SCREENING FOR OSTEOPOROSIS: ICD-10-CM

## 2024-05-31 DIAGNOSIS — M47.819 SPONDYLOSIS W/OUT MYELOPATHY OR RADICULOPATHY, SITE UNSPECIFIED: ICD-10-CM

## 2024-05-31 PROCEDURE — 99386 PREV VISIT NEW AGE 40-64: CPT | Mod: 25

## 2024-05-31 RX ORDER — TIZANIDINE 4 MG/1
4 TABLET ORAL 3 TIMES DAILY
Qty: 90 | Refills: 2 | Status: DISCONTINUED | COMMUNITY
Start: 2022-11-23 | End: 2024-05-31

## 2024-05-31 RX ORDER — CHOLECALCIFEROL (VITAMIN D3) 1250 MCG
1.25 MG CAPSULE ORAL
Qty: 8 | Refills: 1 | Status: DISCONTINUED | COMMUNITY
Start: 2019-11-01 | End: 2024-05-31

## 2024-05-31 RX ORDER — DICLOFENAC SODIUM 1% 10 MG/G
1 GEL TOPICAL
Qty: 3 | Refills: 2 | Status: DISCONTINUED | OUTPATIENT
Start: 2021-10-19 | End: 2024-05-31

## 2024-05-31 RX ORDER — DICLOFENAC SODIUM 75 MG/1
75 TABLET, DELAYED RELEASE ORAL
Qty: 120 | Refills: 0 | Status: DISCONTINUED | COMMUNITY
Start: 2021-10-21 | End: 2024-05-31

## 2024-05-31 RX ORDER — DICLOFENAC SODIUM 10 MG/G
1 GEL TOPICAL DAILY
Qty: 1 | Refills: 3 | Status: DISCONTINUED | COMMUNITY
Start: 2017-05-04 | End: 2024-05-31

## 2024-05-31 RX ORDER — DOXYCYCLINE HYCLATE 100 MG/1
100 CAPSULE ORAL
Qty: 42 | Refills: 1 | Status: DISCONTINUED | COMMUNITY
Start: 2017-04-06 | End: 2024-05-31

## 2024-05-31 RX ORDER — ADALIMUMAB 40MG/0.4ML
40 KIT SUBCUTANEOUS
Qty: 2 | Refills: 6 | Status: ACTIVE | COMMUNITY
Start: 2021-06-03

## 2024-05-31 RX ORDER — DICLOFENAC SODIUM 50 MG/1
50 TABLET, DELAYED RELEASE ORAL
Qty: 60 | Refills: 1 | Status: DISCONTINUED | COMMUNITY
Start: 2022-06-03 | End: 2024-05-31

## 2024-05-31 RX ORDER — CHOLECALCIFEROL (VITAMIN D3) 1250 MCG
1.25 MG CAPSULE ORAL
Qty: 4 | Refills: 3 | Status: DISCONTINUED | COMMUNITY
Start: 2022-05-22 | End: 2024-05-31

## 2024-05-31 RX ORDER — NAPROXEN 375 MG/1
375 TABLET ORAL
Qty: 60 | Refills: 1 | Status: DISCONTINUED | COMMUNITY
Start: 2019-10-31 | End: 2024-05-31

## 2024-05-31 RX ORDER — GABAPENTIN 100 MG/1
100 CAPSULE ORAL
Qty: 60 | Refills: 1 | Status: ACTIVE | COMMUNITY
Start: 2017-05-25

## 2024-05-31 RX ORDER — LIDOCAINE AND TETRACAINE 70; 70 MG/G; MG/G
7-7 CREAM TOPICAL
Qty: 1 | Refills: 3 | Status: DISCONTINUED | COMMUNITY
Start: 2019-07-11 | End: 2024-05-31

## 2024-05-31 RX ORDER — LIDOCAINE 5 G/100G
5 OINTMENT TOPICAL
Qty: 1 | Refills: 0 | Status: ACTIVE | COMMUNITY
Start: 2020-09-03

## 2024-05-31 RX ORDER — METRONIDAZOLE 7.5 MG/G
0.75 CREAM TOPICAL DAILY
Qty: 1 | Refills: 2 | Status: ACTIVE | COMMUNITY
Start: 2017-04-06 | End: 1900-01-01

## 2024-05-31 RX ORDER — CIPROFLOXACIN HYDROCHLORIDE 500 MG/1
500 TABLET, FILM COATED ORAL
Qty: 20 | Refills: 0 | Status: DISCONTINUED | COMMUNITY
Start: 2021-10-21 | End: 2024-05-31

## 2024-05-31 NOTE — PHYSICAL EXAM
[No Acute Distress] : no acute distress [Well-Appearing] : well-appearing [Normal Sclera/Conjunctiva] : normal sclera/conjunctiva [PERRL] : pupils equal round and reactive to light [EOMI] : extraocular movements intact [Normal Outer Ear/Nose] : the outer ears and nose were normal in appearance [Normal Oropharynx] : the oropharynx was normal [Normal TMs] : both tympanic membranes were normal [Normal Nasal Mucosa] : the nasal mucosa was normal [No Lymphadenopathy] : no lymphadenopathy [Supple] : supple [No Respiratory Distress] : no respiratory distress  [No Accessory Muscle Use] : no accessory muscle use [Clear to Auscultation] : lungs were clear to auscultation bilaterally [Normal Rate] : normal rate  [Regular Rhythm] : with a regular rhythm [Normal S1, S2] : normal S1 and S2 [No Edema] : there was no peripheral edema [Soft] : abdomen soft [Non Tender] : non-tender [Non-distended] : non-distended [Normal Bowel Sounds] : normal bowel sounds [Normal Posterior Cervical Nodes] : no posterior cervical lymphadenopathy [Normal Anterior Cervical Nodes] : no anterior cervical lymphadenopathy [No CVA Tenderness] : no CVA  tenderness [No Spinal Tenderness] : no spinal tenderness [Grossly Normal Strength/Tone] : grossly normal strength/tone [Coordination Grossly Intact] : coordination grossly intact [Normal Affect] : the affect was normal [Normal Insight/Judgement] : insight and judgment were intact [No Joint Swelling] : no joint swelling

## 2024-05-31 NOTE — REVIEW OF SYSTEMS
[Joint Pain] : joint pain [Back Pain] : back pain [Anxiety] : anxiety [Fever] : no fever [Chills] : no chills [Pain] : no pain [Vision Problems] : no vision problems [Nasal Discharge] : no nasal discharge [Sore Throat] : no sore throat [Chest Pain] : no chest pain [Palpitations] : no palpitations [Lower Ext Edema] : no lower extremity edema [Shortness Of Breath] : no shortness of breath [Cough] : no cough [Abdominal Pain] : no abdominal pain [Constipation] : no constipation [Diarrhea] : diarrhea [Vomiting] : no vomiting [Melena] : no melena [Dysuria] : no dysuria [Hematuria] : no hematuria [Frequency] : no frequency [Itching] : no itching [Skin Rash] : no skin rash [Headache] : no headache [Dizziness] : no dizziness [Suicidal] : not suicidal [Depression] : no depression [Easy Bleeding] : no easy bleeding [Easy Bruising] : no easy bruising

## 2024-05-31 NOTE — HEALTH RISK ASSESSMENT
[No] : In the past 12 months have you used drugs other than those required for medical reasons? No [Little interest or pleasure doing things] : 1) Little interest or pleasure doing things [Feeling down, depressed, or hopeless] : 2) Feeling down, depressed, or hopeless [0] : 2) Feeling down, depressed, or hopeless: Not at all (0) [PHQ-2 Negative - No further assessment needed] : PHQ-2 Negative - No further assessment needed [Alone] : lives alone [Employed] : employed [Single] : single [Never] : Never [de-identified] : walks [de-identified] : feels it is balanced  [GWG2Qtnur] : 0 [Sexually Active] : not sexually active [High Risk Behavior] : no high risk behavior [FreeTextEntry2] : yamila

## 2024-05-31 NOTE — PLAN
[FreeTextEntry1] : 1. CPE - Routine labs ordered (CBC, CMP, Lipid panel, TSH, A1c) - UTD with papsmear - Vaccinations: defers Flu, covid19 x2 and 1 booster, Tdap - defers, Shingrix -- defers  - Breast Cancer Screening -- Last Mammogram: 2014 -- defers; understands risk of missed diagnosis of breast cancer; states her gyn ordered a breast US - Colon cancer screening: Last colonoscopy: 12/7/2021 -- repeat was recommended in 5 years -- 12/2026 - Depression screen with PHQ2 negative(minimum of 5 mins spent on screen) - Due for routine dental and UTD with eye exams  2. Spondyloarthropathy HLA B27 Positive - managed with gabapentin and humira - Follows with rheumatology and orthopedics  3. Thyroid Nodule -  thyroid nodule s/p FNA(2015)  - TSH ordered - thyroid US ordered   4. Multi joint, Neck, and Back Pain - voltaren, Euflexxa, and gabapentin - management per orthopedics and rheumatology   5. Anxiety - denies SI - feels it is currently well managened off medication  
Statement Selected

## 2024-05-31 NOTE — HISTORY OF PRESENT ILLNESS
[FreeTextEntry1] : New CPE [de-identified] : The patient is a 54yo female with pmhx of multi joint pain, arthritis, spondyloarthropathy HLA B27 positive, thyroid nodule s/p FNA(2015) who presents today for a CPE.  Vaccinations: defers Flu, covid19 x2 and 1 booster, Tdap - defers, Shingrix -- defers   Last Papsmear: per pt 8/2023 -- reports it as normal; Menopause 2022  Last Mammogram: 2014 -- defers; understands risk of missed diagnosis of breast cancer; states her gyn ordered a breast US  Last colonoscopy: 12/7/2021 -- repeat was recommended in 5 years -- 12/2026  Due for routine dental and UTD with eye exams  She follows with rheumatology for hx of arthritis and spondyloarthropathy HLA B27 positive She follows with orthopedics pain management for hx of back, neck, joint, and myofascial pain

## 2024-06-28 ENCOUNTER — APPOINTMENT (OUTPATIENT)
Dept: FAMILY MEDICINE | Facility: CLINIC | Age: 54
End: 2024-06-28
Payer: COMMERCIAL

## 2024-06-28 VITALS
DIASTOLIC BLOOD PRESSURE: 70 MMHG | TEMPERATURE: 97.8 F | HEART RATE: 63 BPM | SYSTOLIC BLOOD PRESSURE: 109 MMHG | OXYGEN SATURATION: 98 % | WEIGHT: 238 LBS | BODY MASS INDEX: 36.07 KG/M2 | HEIGHT: 68 IN

## 2024-06-28 DIAGNOSIS — J06.9 ACUTE UPPER RESPIRATORY INFECTION, UNSPECIFIED: ICD-10-CM

## 2024-06-28 DIAGNOSIS — E04.1 NONTOXIC SINGLE THYROID NODULE: ICD-10-CM

## 2024-06-28 PROCEDURE — G2211 COMPLEX E/M VISIT ADD ON: CPT

## 2024-06-28 PROCEDURE — 99213 OFFICE O/P EST LOW 20 MIN: CPT

## 2024-07-01 ENCOUNTER — TRANSCRIPTION ENCOUNTER (OUTPATIENT)
Age: 54
End: 2024-07-01

## 2024-07-01 LAB — SARS-COV-2 N GENE NPH QL NAA+PROBE: NOT DETECTED

## 2024-07-01 RX ORDER — AMOXICILLIN AND CLAVULANATE POTASSIUM 875; 125 MG/1; MG/1
875-125 TABLET, COATED ORAL
Qty: 10 | Refills: 0 | Status: ACTIVE | COMMUNITY
Start: 2024-07-01 | End: 1900-01-01

## 2024-07-11 ENCOUNTER — APPOINTMENT (OUTPATIENT)
Dept: OTOLARYNGOLOGY | Facility: CLINIC | Age: 54
End: 2024-07-11
Payer: COMMERCIAL

## 2024-07-11 VITALS
DIASTOLIC BLOOD PRESSURE: 81 MMHG | WEIGHT: 235 LBS | OXYGEN SATURATION: 100 % | HEART RATE: 60 BPM | SYSTOLIC BLOOD PRESSURE: 125 MMHG | RESPIRATION RATE: 18 BRPM | HEIGHT: 69 IN | TEMPERATURE: 98 F | BODY MASS INDEX: 34.8 KG/M2

## 2024-07-11 DIAGNOSIS — K21.9 ACUTE LARYNGITIS: ICD-10-CM

## 2024-07-11 DIAGNOSIS — D44.0 NEOPLASM OF UNCERTAIN BEHAVIOR OF THYROID GLAND: ICD-10-CM

## 2024-07-11 DIAGNOSIS — J04.0 ACUTE LARYNGITIS: ICD-10-CM

## 2024-07-11 DIAGNOSIS — R22.1 LOCALIZED SWELLING, MASS AND LUMP, NECK: ICD-10-CM

## 2024-07-11 PROCEDURE — 31575 DIAGNOSTIC LARYNGOSCOPY: CPT

## 2024-07-11 PROCEDURE — 99205 OFFICE O/P NEW HI 60 MIN: CPT | Mod: 25

## 2024-07-11 PROCEDURE — 10005 FNA BX W/US GDN 1ST LES: CPT

## 2024-07-16 DIAGNOSIS — R92.8 OTHER ABNORMAL AND INCONCLUSIVE FINDINGS ON DIAGNOSTIC IMAGING OF BREAST: ICD-10-CM

## 2024-07-16 LAB — FNA, THYROID: NORMAL

## 2024-07-30 DIAGNOSIS — E55.9 VITAMIN D DEFICIENCY, UNSPECIFIED: ICD-10-CM

## 2024-07-30 DIAGNOSIS — M47.819 SPONDYLOSIS W/OUT MYELOPATHY OR RADICULOPATHY, SITE UNSPECIFIED: ICD-10-CM

## 2024-09-03 ENCOUNTER — APPOINTMENT (OUTPATIENT)
Dept: RHEUMATOLOGY | Facility: CLINIC | Age: 54
End: 2024-09-03
Payer: SELF-PAY

## 2024-09-03 VITALS
SYSTOLIC BLOOD PRESSURE: 113 MMHG | OXYGEN SATURATION: 98 % | HEART RATE: 65 BPM | TEMPERATURE: 97.5 F | HEIGHT: 69 IN | BODY MASS INDEX: 35.07 KG/M2 | RESPIRATION RATE: 16 BRPM | WEIGHT: 236.8 LBS | DIASTOLIC BLOOD PRESSURE: 75 MMHG

## 2024-09-03 DIAGNOSIS — I83.93 ASYMPTOMATIC VARICOSE VEINS OF BILATERAL LOWER EXTREMITIES: ICD-10-CM

## 2024-09-03 DIAGNOSIS — M17.0 BILATERAL PRIMARY OSTEOARTHRITIS OF KNEE: ICD-10-CM

## 2024-09-03 DIAGNOSIS — M23.006 CYSTIC MENISCUS, UNSPECIFIED MENISCUS, RIGHT KNEE: ICD-10-CM

## 2024-09-03 DIAGNOSIS — M47.817 SPONDYLOSIS W/OUT MYELOPATHY OR RADICULOPATHY, LUMBOSACRAL REGION: ICD-10-CM

## 2024-09-03 PROCEDURE — 76881 US COMPL JOINT R-T W/IMG: CPT

## 2024-09-03 PROCEDURE — 99214 OFFICE O/P EST MOD 30 MIN: CPT | Mod: 25

## 2024-09-04 ENCOUNTER — RX RENEWAL (OUTPATIENT)
Age: 54
End: 2024-09-04

## 2024-09-04 PROBLEM — M23.006 PERIMENISCAL CYST OF RIGHT KNEE: Status: ACTIVE | Noted: 2024-09-04

## 2024-09-04 NOTE — PROCEDURE
[Other Date:___] : Date: [unfilled] [Consent Obtained] : written consent was obtained prior to the procedure and is detailed in the patient's record [Diagnostic] : diagnostic  [#1 Site: ______] : #1 site identified in the [unfilled] [No Complications] : there were no complications [FreeTextEntry1] : Patient Name : Brandy Lake  YOB: 1970 Study Date: 9/3/2024 Study Type: Complete study  Indication: Pain in RT knee   Study Site: RT knee Equipment: [employer] e 12MHz linear transducer     Findings: Orthogonal views of the anterior, posterior, medial and lateral aspects of the knee were obtained in grey scale.  Trace hypoechoic pocket in the suprapatellar fossa in long and short  axis with cortical irregularities of hyperechoic linear structure  , marginal osteophytes and joint space narrowing.  thickening of the medial collateral ligament with abutting of the medial meniscus.  Lateral meniscus within cleft.  Hypoechoic pockets surround medial meniscus ; patellar tendon with fine alternating parallel lines with speckled pattern on short axis.  No muscle or tendon or tendon sheath abnormalities seen in the anterior or posterior compartments.  No fluid identified in popliteal fossa.   Impressions:   Moderate Degenerative Joint Disease.   Perimeniscal cyst   No specific findings to suggest crystal related arthritis.

## 2024-09-04 NOTE — PHYSICAL EXAM
[General Appearance - Alert] : alert [General Appearance - In No Acute Distress] : in no acute distress [Sclera] : the sclera and conjunctiva were normal [Edema] : there was no peripheral edema [No Spinal Tenderness] : no spinal tenderness [] : no rash [Skin Lesions] : no skin lesions [Motor Exam] : the motor exam was normal [Oriented To Time, Place, And Person] : oriented to person, place, and time [Impaired Insight] : insight and judgment were intact [Nail Clubbing] : no clubbing  or cyanosis of the fingernails [Musculoskeletal - Swelling] : no joint swelling seen [Motor Tone] : muscle strength and tone were normal [FreeTextEntry1] : No active synovitis ; knees with mild tenderness along the medial joint lines, RT>LT

## 2024-09-04 NOTE — CONSULT LETTER
[Dear  ___] : Dear  [unfilled], [Courtesy Letter:] : I had the pleasure of seeing your patient, [unfilled], in my office today. [Please see my note below.] : Please see my note below. [Consult Closing:] : Thank you very much for allowing me to participate in the care of this patient.  If you have any questions, please do not hesitate to contact me. [Sincerely,] : Sincerely, [FreeTextEntry2] : Winsome Garcia MD  [FreeTextEntry3] : Yamileth Reveles M.D.\par   of Medicine \par  [facility] [employer] at [facility]\par  \par

## 2024-09-04 NOTE — ASSESSMENT
[FreeTextEntry1] : Patient with long-standing arthralgias, bilateral knee pain secondary to mechanical disease;  moderate improvement hip pain in the setting of HLA-B27 positivity; plantar fasciitis:  Patient to c/w TNF-alpha inhibitor for a spondyloarthropathy presentation.  Discussed increased risk of serious infections related to biologic therapy and the importance of regular vaccinations.  Patient understands increased risk of cardiovascular disease in the setting of inflammatory arthritis and the importance of exercise modification; patient to look into gym and start cycling versus treadmill so as not to worsen planter fasciitis at this time.  Posterior chain strengthening encouraged as well as appropriate footwear. POCUS of the RT knee reflects DJD with perimeniscal cyst sequalae; patient would like to avoid asp/cortisone injections and would like to focus on physical therapy to help with joint mobility and muscle strengthening quadriceps strengthening exercises demonstrated in the office.  Weight loss has been encouraged to reduce load over the medial joint line.  Viscosupplementation has been encouraged to provide additional lubrication and joint support.   In the interim, Diclofenac gel has been rec.  Core strengthening and exercise modification emphasized. Neuropathic pain therapy and muscle relaxants continued.  Dietary and exercise modification emphasized   She is in agreement with the above plan and will return in one months' time.

## 2024-09-04 NOTE — HISTORY OF PRESENT ILLNESS
[FreeTextEntry1] : Patient returns for follow-up and explains moderate improvement in hip and lumbar tightness , increasing LE mobility, with the use of Adalimumab.  She finds lower back and knee pain are intermittent though continues with instability symptoms and stiffness over the knees, RT>LT.  She has tried modification of footwear as well as continues posterior chain strengthening exercises for plantar fasciitis.  As for the lower back, she has tried nerve block to L3-S1 with minimal relief however finds the titration of gabapentin has improved lower extremity discomfort, pain management guidance appreciated.  Additional trigger point injections for cervical radiculopathy administered by pain colleagues;  patient takes tizanidine intermittently though does report lethargy accompanying tx. Patient seen by vascular colleagues, consult appreciated and recommended to wear compression stockings for varicosities. Hip and spine and hip films reflect DJD changes ; SI joint without erosive sequela.  MR imaging performed in October 2021 reflects mild multilevel annular bulge/ osteophyte formation most notable at L4-5 and L5-S1 without foraminal stenosis with endplate marrow signal changes at L4-5.  Patient with  testing reflecting HLA-B27 positivity; patient with quelled sedimentation rate otherwise normal markers.   Blood testing reflecting normal surveillance labs including QF gold testing and hepatitis testing.  Prior IgG+ C. Trachomatis noted. Patient had a fleeting low titer CCP antibody in the past as well as a low titer CAITIE of 1:80 nucleolar pattern. Many encounters ago, the patient had a psoriatic patch over the elbow which she notes was remedied by emollients application.  She denies photosensitivity or Raynauds.  She further denies visual/ GI disturbances, oral ulcers, dyspnea, chest pain, motor disturbances.

## 2024-09-06 ENCOUNTER — APPOINTMENT (OUTPATIENT)
Dept: FAMILY MEDICINE | Facility: CLINIC | Age: 54
End: 2024-09-06
Payer: COMMERCIAL

## 2024-09-06 VITALS
DIASTOLIC BLOOD PRESSURE: 83 MMHG | BODY MASS INDEX: 34.96 KG/M2 | HEART RATE: 66 BPM | SYSTOLIC BLOOD PRESSURE: 128 MMHG | WEIGHT: 236 LBS | OXYGEN SATURATION: 98 % | HEIGHT: 69 IN | RESPIRATION RATE: 16 BRPM

## 2024-09-06 DIAGNOSIS — M77.50 OTHER ENTHESOPATHY OF UNSPCFD FOOT AND ANKLE: ICD-10-CM

## 2024-09-06 DIAGNOSIS — E04.1 NONTOXIC SINGLE THYROID NODULE: ICD-10-CM

## 2024-09-06 DIAGNOSIS — E78.5 HYPERLIPIDEMIA, UNSPECIFIED: ICD-10-CM

## 2024-09-06 DIAGNOSIS — Z15.89 GENETIC SUSCEPTIBILITY TO OTHER DISEASE: ICD-10-CM

## 2024-09-06 DIAGNOSIS — F41.9 ANXIETY DISORDER, UNSPECIFIED: ICD-10-CM

## 2024-09-06 DIAGNOSIS — M17.11 UNILATERAL PRIMARY OSTEOARTHRITIS, RIGHT KNEE: ICD-10-CM

## 2024-09-06 DIAGNOSIS — R92.8 OTHER ABNORMAL AND INCONCLUSIVE FINDINGS ON DIAGNOSTIC IMAGING OF BREAST: ICD-10-CM

## 2024-09-06 DIAGNOSIS — M47.819 SPONDYLOSIS W/OUT MYELOPATHY OR RADICULOPATHY, SITE UNSPECIFIED: ICD-10-CM

## 2024-09-06 DIAGNOSIS — E55.9 VITAMIN D DEFICIENCY, UNSPECIFIED: ICD-10-CM

## 2024-09-06 PROCEDURE — 99214 OFFICE O/P EST MOD 30 MIN: CPT

## 2024-09-06 PROCEDURE — G2211 COMPLEX E/M VISIT ADD ON: CPT | Mod: NC

## 2024-09-06 RX ORDER — MOMETASONE FUROATE 1 MG/G
0.1 CREAM TOPICAL
Qty: 15 | Refills: 0 | Status: ACTIVE | COMMUNITY
Start: 2024-08-22

## 2024-09-06 RX ORDER — CHOLECALCIFEROL (VITAMIN D3) 1250 MCG
1.25 MG CAPSULE ORAL
Qty: 1 | Refills: 2 | Status: ACTIVE | COMMUNITY
Start: 2024-09-06 | End: 1900-01-01

## 2024-09-06 NOTE — PHYSICAL EXAM
[No Acute Distress] : no acute distress [Normal Sclera/Conjunctiva] : normal sclera/conjunctiva [EOMI] : extraocular movements intact [Normal Outer Ear/Nose] : the outer ears and nose were normal in appearance [Normal TMs] : both tympanic membranes were normal [Normal Nasal Mucosa] : the nasal mucosa was normal [No Lymphadenopathy] : no lymphadenopathy [Supple] : supple [No Respiratory Distress] : no respiratory distress  [No Accessory Muscle Use] : no accessory muscle use [Clear to Auscultation] : lungs were clear to auscultation bilaterally [Normal Rate] : normal rate  [Regular Rhythm] : with a regular rhythm [No Edema] : there was no peripheral edema [Soft] : abdomen soft [Non Tender] : non-tender [Non-distended] : non-distended [Normal Bowel Sounds] : normal bowel sounds [No CVA Tenderness] : no CVA  tenderness [No Spinal Tenderness] : no spinal tenderness [No Rash] : no rash [Coordination Grossly Intact] : coordination grossly intact [Normal Affect] : the affect was normal [Normal Insight/Judgement] : insight and judgment were intact

## 2024-09-06 NOTE — PLAN
[FreeTextEntry1] : 1. Hx of Abnormal Mammogram - Referral for 6 month repeat left diagnostic mammogram and b/l breast US provided -- to be done after 1/16/25  2. Spondyloarthropathy HLA B27 Positive - managed with gabapentin and humira - Follows with rheumatology and orthopedics  3. Thyroid Nodule -  thyroid nodule s/p FNA(2015)  and now s/p normal FNA 7/11/24 - TSH ordered - Has established care with ENT  4. Multi joint, Neck, and Back Pain - voltaren, Euflexxa, and gabapentin - management per orthopedics and rheumatology   5. Anxiety - denies SI - feels it is currently well managed off medication   6. Foot Bone Spur - right foot - States she was recently diagnosed with a bone spur in her foot -- states she saw a podiatrist, but would like a second opinion-- referral provided  7. Vitamin D Defieicncy - Rx for vitamin D ordered  8. Elevated Lipids - low cholesterol diet - script for repeat lipid panel provided -- pt to complete when fasting  Labs from 8/31/24 reviewed with pt

## 2024-09-06 NOTE — HISTORY OF PRESENT ILLNESS
[FreeTextEntry1] : Follow Up [de-identified] : The patient is a 54yo female with pmhx of multi joint pain, arthritis, spondyloarthropathy HLA B27 positive, thyroid nodule s/p FNA(2015) who presents today for a follow up  States she was recently diagnosed with a bone spur in her foot -- states she saw a podiatrist, but would like a second opinion  She follows with rheumatology for hx of arthritis and spondyloarthropathy HLA B27 positive She follows with orthopedics pain management for hx of back, neck, joint, and myofascial pain  She has established care with ENT for TR-5 right lower lobe thyroid nodule -- s/p benign FNA; states 1 year follow up was recommended

## 2024-09-20 RX ORDER — HYALURONATE SODIUM 20 MG/2 ML
20 SYRINGE (ML) INTRAARTICULAR
Qty: 2 | Refills: 0 | Status: ACTIVE | COMMUNITY
Start: 2024-09-19

## 2024-09-26 ENCOUNTER — NON-APPOINTMENT (OUTPATIENT)
Age: 54
End: 2024-09-26

## 2024-10-18 ENCOUNTER — TRANSCRIPTION ENCOUNTER (OUTPATIENT)
Age: 54
End: 2024-10-18

## 2024-10-21 ENCOUNTER — TRANSCRIPTION ENCOUNTER (OUTPATIENT)
Age: 54
End: 2024-10-21

## 2024-11-07 ENCOUNTER — APPOINTMENT (OUTPATIENT)
Dept: RHEUMATOLOGY | Facility: CLINIC | Age: 54
End: 2024-11-07

## 2024-11-12 ENCOUNTER — APPOINTMENT (OUTPATIENT)
Dept: RHEUMATOLOGY | Facility: CLINIC | Age: 54
End: 2024-11-12

## 2024-11-12 VITALS
HEIGHT: 69 IN | HEART RATE: 71 BPM | BODY MASS INDEX: 34.21 KG/M2 | TEMPERATURE: 97.5 F | OXYGEN SATURATION: 98 % | WEIGHT: 231 LBS | DIASTOLIC BLOOD PRESSURE: 82 MMHG | RESPIRATION RATE: 16 BRPM | SYSTOLIC BLOOD PRESSURE: 125 MMHG

## 2024-11-12 DIAGNOSIS — Z15.89 GENETIC SUSCEPTIBILITY TO OTHER DISEASE: ICD-10-CM

## 2024-11-12 DIAGNOSIS — M47.819 SPONDYLOSIS W/OUT MYELOPATHY OR RADICULOPATHY, SITE UNSPECIFIED: ICD-10-CM

## 2024-11-12 DIAGNOSIS — M47.817 SPONDYLOSIS W/OUT MYELOPATHY OR RADICULOPATHY, LUMBOSACRAL REGION: ICD-10-CM

## 2024-11-12 PROCEDURE — 20610 DRAIN/INJ JOINT/BURSA W/O US: CPT | Mod: 50

## 2024-11-12 PROCEDURE — 99213 OFFICE O/P EST LOW 20 MIN: CPT | Mod: 25

## 2024-11-12 RX ORDER — HYALURONATE SODIUM 20 MG/2 ML
20 SYRINGE (ML) INTRAARTICULAR
Qty: 0 | Refills: 0 | Status: COMPLETED | OUTPATIENT
Start: 2024-11-12

## 2024-11-12 RX ADMIN — Medication 0 MG/2ML: at 00:00

## 2024-11-19 ENCOUNTER — APPOINTMENT (OUTPATIENT)
Dept: RHEUMATOLOGY | Facility: CLINIC | Age: 54
End: 2024-11-19
Payer: COMMERCIAL

## 2024-11-19 VITALS
TEMPERATURE: 97.9 F | HEART RATE: 72 BPM | RESPIRATION RATE: 16 BRPM | WEIGHT: 231 LBS | OXYGEN SATURATION: 97 % | DIASTOLIC BLOOD PRESSURE: 78 MMHG | BODY MASS INDEX: 34.21 KG/M2 | HEIGHT: 69 IN | SYSTOLIC BLOOD PRESSURE: 128 MMHG

## 2024-11-19 PROCEDURE — 20610 DRAIN/INJ JOINT/BURSA W/O US: CPT | Mod: 50

## 2024-11-19 RX ORDER — HYALURONATE SODIUM 20 MG/2 ML
20 SYRINGE (ML) INTRAARTICULAR
Qty: 1 | Refills: 0 | Status: ACTIVE | COMMUNITY
Start: 2024-11-19 | End: 1900-01-01

## 2024-11-19 RX ORDER — HYALURONATE SODIUM 20 MG/2 ML
20 SYRINGE (ML) INTRAARTICULAR
Qty: 0 | Refills: 0 | Status: COMPLETED | OUTPATIENT
Start: 2024-11-19

## 2024-11-19 RX ADMIN — Medication 0 MG/2ML: at 00:00

## 2024-11-26 ENCOUNTER — APPOINTMENT (OUTPATIENT)
Dept: RADIOLOGY | Facility: CLINIC | Age: 54
End: 2024-11-26
Payer: COMMERCIAL

## 2024-11-26 ENCOUNTER — RESULT REVIEW (OUTPATIENT)
Age: 54
End: 2024-11-26

## 2024-11-26 ENCOUNTER — APPOINTMENT (OUTPATIENT)
Dept: RHEUMATOLOGY | Facility: CLINIC | Age: 54
End: 2024-11-26
Payer: COMMERCIAL

## 2024-11-26 VITALS
BODY MASS INDEX: 34.51 KG/M2 | HEART RATE: 77 BPM | HEIGHT: 69 IN | TEMPERATURE: 98 F | DIASTOLIC BLOOD PRESSURE: 76 MMHG | SYSTOLIC BLOOD PRESSURE: 132 MMHG | RESPIRATION RATE: 16 BRPM | WEIGHT: 233 LBS | OXYGEN SATURATION: 98 %

## 2024-11-26 DIAGNOSIS — M17.0 BILATERAL PRIMARY OSTEOARTHRITIS OF KNEE: ICD-10-CM

## 2024-11-26 DIAGNOSIS — S93.401A SPRAIN OF UNSPECIFIED LIGAMENT OF RIGHT ANKLE, INITIAL ENCOUNTER: ICD-10-CM

## 2024-11-26 PROCEDURE — 73610 X-RAY EXAM OF ANKLE: CPT | Mod: RT

## 2024-11-26 PROCEDURE — 20610 DRAIN/INJ JOINT/BURSA W/O US: CPT | Mod: 50

## 2024-11-26 PROCEDURE — 99213 OFFICE O/P EST LOW 20 MIN: CPT | Mod: 25

## 2024-11-26 RX ORDER — HYALURONATE SODIUM 20 MG/2 ML
20 SYRINGE (ML) INTRAARTICULAR
Qty: 0 | Refills: 0 | Status: COMPLETED | OUTPATIENT
Start: 2024-11-26

## 2024-11-26 RX ADMIN — Medication 0 MG/2ML: at 00:00

## 2024-12-06 ENCOUNTER — APPOINTMENT (OUTPATIENT)
Dept: FAMILY MEDICINE | Facility: CLINIC | Age: 54
End: 2024-12-06

## 2024-12-06 VITALS
SYSTOLIC BLOOD PRESSURE: 126 MMHG | TEMPERATURE: 98.5 F | WEIGHT: 234 LBS | OXYGEN SATURATION: 98 % | HEIGHT: 69 IN | HEART RATE: 69 BPM | DIASTOLIC BLOOD PRESSURE: 73 MMHG | BODY MASS INDEX: 34.66 KG/M2

## 2024-12-06 DIAGNOSIS — T78.40XA ALLERGY, UNSPECIFIED, INITIAL ENCOUNTER: ICD-10-CM

## 2024-12-06 DIAGNOSIS — E78.5 HYPERLIPIDEMIA, UNSPECIFIED: ICD-10-CM

## 2024-12-06 DIAGNOSIS — E04.1 NONTOXIC SINGLE THYROID NODULE: ICD-10-CM

## 2024-12-06 DIAGNOSIS — M77.50 OTHER ENTHESOPATHY OF UNSPCFD FOOT AND ANKLE: ICD-10-CM

## 2024-12-06 DIAGNOSIS — Z15.89 GENETIC SUSCEPTIBILITY TO OTHER DISEASE: ICD-10-CM

## 2024-12-06 DIAGNOSIS — M47.819 SPONDYLOSIS W/OUT MYELOPATHY OR RADICULOPATHY, SITE UNSPECIFIED: ICD-10-CM

## 2024-12-06 DIAGNOSIS — R92.8 OTHER ABNORMAL AND INCONCLUSIVE FINDINGS ON DIAGNOSTIC IMAGING OF BREAST: ICD-10-CM

## 2024-12-06 DIAGNOSIS — Z23 ENCOUNTER FOR IMMUNIZATION: ICD-10-CM

## 2024-12-06 DIAGNOSIS — F41.9 ANXIETY DISORDER, UNSPECIFIED: ICD-10-CM

## 2024-12-06 DIAGNOSIS — E55.9 VITAMIN D DEFICIENCY, UNSPECIFIED: ICD-10-CM

## 2024-12-06 DIAGNOSIS — M17.11 UNILATERAL PRIMARY OSTEOARTHRITIS, RIGHT KNEE: ICD-10-CM

## 2024-12-06 PROCEDURE — 90750 HZV VACC RECOMBINANT IM: CPT

## 2024-12-06 PROCEDURE — 36415 COLL VENOUS BLD VENIPUNCTURE: CPT

## 2024-12-06 PROCEDURE — 99214 OFFICE O/P EST MOD 30 MIN: CPT | Mod: 25

## 2024-12-06 PROCEDURE — 90471 IMMUNIZATION ADMIN: CPT

## 2024-12-09 LAB
ALBUMIN SERPL ELPH-MCNC: 4.2 G/DL
ALP BLD-CCNC: 101 U/L
ALT SERPL-CCNC: 26 U/L
ANION GAP SERPL CALC-SCNC: 13 MMOL/L
AST SERPL-CCNC: 18 U/L
BILIRUB SERPL-MCNC: 1 MG/DL
BUN SERPL-MCNC: 10 MG/DL
CALCIUM SERPL-MCNC: 9.3 MG/DL
CHLORIDE SERPL-SCNC: 106 MMOL/L
CO2 SERPL-SCNC: 22 MMOL/L
CREAT SERPL-MCNC: 0.7 MG/DL
EGFR: 105 ML/MIN/1.73M2
GLUCOSE SERPL-MCNC: 90 MG/DL
POTASSIUM SERPL-SCNC: 4.7 MMOL/L
PROT SERPL-MCNC: 7.2 G/DL
SODIUM SERPL-SCNC: 141 MMOL/L

## 2025-02-25 ENCOUNTER — RX RENEWAL (OUTPATIENT)
Age: 55
End: 2025-02-25

## 2025-02-25 RX ORDER — ADALIMUMAB 40MG/0.4ML
40 KIT SUBCUTANEOUS
Qty: 1 | Refills: 6 | Status: ACTIVE | COMMUNITY
Start: 2025-02-25 | End: 1900-01-01

## 2025-02-27 ENCOUNTER — NON-APPOINTMENT (OUTPATIENT)
Age: 55
End: 2025-02-27

## 2025-02-27 ENCOUNTER — APPOINTMENT (OUTPATIENT)
Dept: RHEUMATOLOGY | Facility: CLINIC | Age: 55
End: 2025-02-27
Payer: COMMERCIAL

## 2025-02-27 VITALS
OXYGEN SATURATION: 95 % | SYSTOLIC BLOOD PRESSURE: 119 MMHG | BODY MASS INDEX: 34.66 KG/M2 | DIASTOLIC BLOOD PRESSURE: 75 MMHG | HEART RATE: 79 BPM | WEIGHT: 234 LBS | RESPIRATION RATE: 16 BRPM | HEIGHT: 69 IN | TEMPERATURE: 97.6 F

## 2025-02-27 DIAGNOSIS — M47.819 SPONDYLOSIS W/OUT MYELOPATHY OR RADICULOPATHY, SITE UNSPECIFIED: ICD-10-CM

## 2025-02-27 DIAGNOSIS — M54.12 RADICULOPATHY, CERVICAL REGION: ICD-10-CM

## 2025-02-27 DIAGNOSIS — M25.562 PAIN IN LEFT KNEE: ICD-10-CM

## 2025-02-27 DIAGNOSIS — M17.11 UNILATERAL PRIMARY OSTEOARTHRITIS, RIGHT KNEE: ICD-10-CM

## 2025-02-27 DIAGNOSIS — M79.672 PAIN IN LEFT FOOT: ICD-10-CM

## 2025-02-27 DIAGNOSIS — Z15.89 GENETIC SUSCEPTIBILITY TO OTHER DISEASE: ICD-10-CM

## 2025-02-27 PROCEDURE — 76882 US LMTD JT/FCL EVL NVASC XTR: CPT

## 2025-02-27 PROCEDURE — 99214 OFFICE O/P EST MOD 30 MIN: CPT | Mod: 25

## 2025-02-27 RX ORDER — MELOXICAM 15 MG/1
15 TABLET ORAL
Qty: 30 | Refills: 1 | Status: ACTIVE | COMMUNITY
Start: 2025-02-27 | End: 1900-01-01

## 2025-02-28 PROBLEM — M79.672 INFLAMMATORY PAIN OF LEFT HEEL: Status: ACTIVE | Noted: 2025-02-28

## 2025-04-17 RX ORDER — DICLOFENAC SODIUM 20 MG/G
2 SOLUTION TOPICAL
Qty: 1 | Refills: 3 | Status: ACTIVE | COMMUNITY
Start: 2025-04-17 | End: 1900-01-01

## 2025-04-28 ENCOUNTER — RX RENEWAL (OUTPATIENT)
Age: 55
End: 2025-04-28

## 2025-05-21 ENCOUNTER — NON-APPOINTMENT (OUTPATIENT)
Age: 55
End: 2025-05-21

## 2025-05-21 ENCOUNTER — APPOINTMENT (OUTPATIENT)
Dept: UROGYNECOLOGY | Facility: CLINIC | Age: 55
End: 2025-05-21
Payer: COMMERCIAL

## 2025-05-21 VITALS
OXYGEN SATURATION: 98 % | DIASTOLIC BLOOD PRESSURE: 76 MMHG | HEIGHT: 69 IN | BODY MASS INDEX: 34.96 KG/M2 | HEART RATE: 66 BPM | SYSTOLIC BLOOD PRESSURE: 120 MMHG | RESPIRATION RATE: 16 BRPM | WEIGHT: 236 LBS | TEMPERATURE: 98 F

## 2025-05-21 DIAGNOSIS — R39.15 URGENCY OF URINATION: ICD-10-CM

## 2025-05-21 DIAGNOSIS — N39.41 URGE INCONTINENCE: ICD-10-CM

## 2025-05-21 PROCEDURE — 51701 INSERT BLADDER CATHETER: CPT

## 2025-05-21 PROCEDURE — 99204 OFFICE O/P NEW MOD 45 MIN: CPT | Mod: 25

## 2025-05-21 PROCEDURE — 99459 PELVIC EXAMINATION: CPT

## 2025-05-23 LAB — BACTERIA UR CULT: NORMAL

## 2025-05-29 ENCOUNTER — APPOINTMENT (OUTPATIENT)
Dept: RHEUMATOLOGY | Facility: CLINIC | Age: 55
End: 2025-05-29
Payer: COMMERCIAL

## 2025-05-29 ENCOUNTER — APPOINTMENT (OUTPATIENT)
Dept: RHEUMATOLOGY | Facility: CLINIC | Age: 55
End: 2025-05-29

## 2025-05-29 VITALS
RESPIRATION RATE: 16 BRPM | DIASTOLIC BLOOD PRESSURE: 82 MMHG | OXYGEN SATURATION: 98 % | HEIGHT: 69 IN | HEART RATE: 77 BPM | SYSTOLIC BLOOD PRESSURE: 121 MMHG | TEMPERATURE: 98 F

## 2025-05-29 DIAGNOSIS — M72.2 PLANTAR FASCIAL FIBROMATOSIS: ICD-10-CM

## 2025-05-29 DIAGNOSIS — M79.672 PAIN IN LEFT FOOT: ICD-10-CM

## 2025-05-29 DIAGNOSIS — M47.817 SPONDYLOSIS W/OUT MYELOPATHY OR RADICULOPATHY, LUMBOSACRAL REGION: ICD-10-CM

## 2025-05-29 PROCEDURE — 99214 OFFICE O/P EST MOD 30 MIN: CPT | Mod: 25

## 2025-05-29 PROCEDURE — 20550 NJX 1 TENDON SHEATH/LIGAMENT: CPT | Mod: LT

## 2025-05-29 RX ORDER — METHYLPRED ACET/NACL,ISO-OS/PF 40 MG/ML
40 VIAL (ML) INJECTION
Qty: 0 | Refills: 0 | Status: COMPLETED | OUTPATIENT
Start: 2025-05-29

## 2025-05-29 RX ADMIN — Medication 0 MG/ML: at 00:00

## 2025-05-30 RX ORDER — HYALURONATE SODIUM 20 MG/2 ML
20 SYRINGE (ML) INTRAARTICULAR
Qty: 2 | Refills: 0 | Status: ACTIVE | COMMUNITY
Start: 2025-05-29

## 2025-06-06 ENCOUNTER — APPOINTMENT (OUTPATIENT)
Dept: FAMILY MEDICINE | Facility: CLINIC | Age: 55
End: 2025-06-06
Payer: COMMERCIAL

## 2025-06-06 VITALS
HEIGHT: 69 IN | BODY MASS INDEX: 34.51 KG/M2 | DIASTOLIC BLOOD PRESSURE: 89 MMHG | HEART RATE: 76 BPM | TEMPERATURE: 97.3 F | WEIGHT: 233 LBS | OXYGEN SATURATION: 99 % | SYSTOLIC BLOOD PRESSURE: 129 MMHG | RESPIRATION RATE: 16 BRPM

## 2025-06-06 PROCEDURE — 99396 PREV VISIT EST AGE 40-64: CPT

## 2025-06-06 PROCEDURE — 36415 COLL VENOUS BLD VENIPUNCTURE: CPT

## 2025-06-07 LAB
25(OH)D3 SERPL-MCNC: 27.1 NG/ML
ALBUMIN SERPL ELPH-MCNC: 4.4 G/DL
ALP BLD-CCNC: 100 U/L
ALT SERPL-CCNC: 28 U/L
ANION GAP SERPL CALC-SCNC: 13 MMOL/L
AST SERPL-CCNC: 22 U/L
BILIRUB SERPL-MCNC: 0.8 MG/DL
BUN SERPL-MCNC: 9 MG/DL
CALCIUM SERPL-MCNC: 9.4 MG/DL
CHLORIDE SERPL-SCNC: 105 MMOL/L
CHOLEST SERPL-MCNC: 200 MG/DL
CO2 SERPL-SCNC: 24 MMOL/L
CREAT SERPL-MCNC: 0.69 MG/DL
CRP SERPL-MCNC: <3 MG/L
EGFRCR SERPLBLD CKD-EPI 2021: 104 ML/MIN/1.73M2
GLUCOSE SERPL-MCNC: 80 MG/DL
HDLC SERPL-MCNC: 65 MG/DL
LDLC SERPL-MCNC: 116 MG/DL
NONHDLC SERPL-MCNC: 135 MG/DL
POTASSIUM SERPL-SCNC: 4.4 MMOL/L
PROT SERPL-MCNC: 7.4 G/DL
SODIUM SERPL-SCNC: 142 MMOL/L
TRIGL SERPL-MCNC: 106 MG/DL
TSH SERPL-ACNC: 0.96 UIU/ML
URATE SERPL-MCNC: 4 MG/DL
VIT B12 SERPL-MCNC: 260 PG/ML

## 2025-06-08 LAB
APPEARANCE: CLEAR
BASOPHILS # BLD AUTO: 0.05 K/UL
BASOPHILS NFR BLD AUTO: 0.9 %
BILIRUBIN URINE: NEGATIVE
BLOOD URINE: NEGATIVE
COLOR: YELLOW
CREAT SPEC-SCNC: 62 MG/DL
CREAT/PROT UR: 0.1 RATIO
EOSINOPHIL # BLD AUTO: 0.06 K/UL
EOSINOPHIL NFR BLD AUTO: 1.1 %
ERYTHROCYTE [SEDIMENTATION RATE] IN BLOOD BY WESTERGREN METHOD: 3 MM/HR
ESTIMATED AVERAGE GLUCOSE: 117 MG/DL
GLUCOSE QUALITATIVE U: NEGATIVE MG/DL
HBA1C MFR BLD HPLC: 5.7 %
HCT VFR BLD CALC: 42.2 %
HGB BLD-MCNC: 12.5 G/DL
IMM GRANULOCYTES NFR BLD AUTO: 0.4 %
KETONES URINE: NEGATIVE MG/DL
LEUKOCYTE ESTERASE URINE: NEGATIVE
LYMPHOCYTES # BLD AUTO: 1.93 K/UL
LYMPHOCYTES NFR BLD AUTO: 34.4 %
MAN DIFF?: NORMAL
MCHC RBC-ENTMCNC: 29.6 G/DL
MCHC RBC-ENTMCNC: 30.1 PG
MCV RBC AUTO: 101.7 FL
MONOCYTES # BLD AUTO: 0.29 K/UL
MONOCYTES NFR BLD AUTO: 5.2 %
NEUTROPHILS # BLD AUTO: 3.26 K/UL
NEUTROPHILS NFR BLD AUTO: 58 %
NITRITE URINE: NEGATIVE
PH URINE: 7
PLATELET # BLD AUTO: 337 K/UL
PROT UR-MCNC: 7 MG/DL
PROTEIN URINE: NEGATIVE MG/DL
RBC # BLD: 4.15 M/UL
RBC # FLD: 14.6 %
SPECIFIC GRAVITY URINE: 1.01
UROBILINOGEN URINE: 0.2 MG/DL
WBC # FLD AUTO: 5.61 K/UL

## 2025-06-13 ENCOUNTER — APPOINTMENT (OUTPATIENT)
Dept: ORTHOPEDIC SURGERY | Facility: CLINIC | Age: 55
End: 2025-06-13

## 2025-06-19 ENCOUNTER — APPOINTMENT (OUTPATIENT)
Dept: RHEUMATOLOGY | Facility: CLINIC | Age: 55
End: 2025-06-19
Payer: COMMERCIAL

## 2025-06-19 VITALS
TEMPERATURE: 98.1 F | HEIGHT: 69 IN | SYSTOLIC BLOOD PRESSURE: 130 MMHG | DIASTOLIC BLOOD PRESSURE: 75 MMHG | HEART RATE: 82 BPM | WEIGHT: 232 LBS | RESPIRATION RATE: 16 BRPM | BODY MASS INDEX: 34.36 KG/M2 | OXYGEN SATURATION: 96 %

## 2025-06-19 PROCEDURE — 20610 DRAIN/INJ JOINT/BURSA W/O US: CPT | Mod: 50

## 2025-06-19 RX ORDER — HYALURONATE SODIUM 20 MG/2 ML
20 SYRINGE (ML) INTRAARTICULAR
Qty: 0 | Refills: 0 | Status: COMPLETED | OUTPATIENT
Start: 2025-06-19

## 2025-06-19 RX ADMIN — Medication 0 MG/2ML: at 00:00

## 2025-06-26 ENCOUNTER — APPOINTMENT (OUTPATIENT)
Dept: RHEUMATOLOGY | Facility: CLINIC | Age: 55
End: 2025-06-26
Payer: COMMERCIAL

## 2025-06-26 VITALS
BODY MASS INDEX: 33.92 KG/M2 | HEIGHT: 69 IN | HEART RATE: 67 BPM | DIASTOLIC BLOOD PRESSURE: 77 MMHG | SYSTOLIC BLOOD PRESSURE: 117 MMHG | RESPIRATION RATE: 16 BRPM | TEMPERATURE: 98.1 F | WEIGHT: 229 LBS | OXYGEN SATURATION: 98 %

## 2025-06-26 PROCEDURE — 20610 DRAIN/INJ JOINT/BURSA W/O US: CPT | Mod: 50

## 2025-06-26 RX ORDER — HYALURONATE SODIUM 20 MG/2 ML
20 SYRINGE (ML) INTRAARTICULAR
Qty: 0 | Refills: 0 | Status: COMPLETED | OUTPATIENT
Start: 2025-06-26

## 2025-06-26 RX ADMIN — Medication 0 MG/2ML: at 00:00

## 2025-07-03 ENCOUNTER — APPOINTMENT (OUTPATIENT)
Dept: OTOLARYNGOLOGY | Facility: CLINIC | Age: 55
End: 2025-07-03

## 2025-07-08 ENCOUNTER — TRANSCRIPTION ENCOUNTER (OUTPATIENT)
Age: 55
End: 2025-07-08

## 2025-07-15 ENCOUNTER — APPOINTMENT (OUTPATIENT)
Dept: RHEUMATOLOGY | Facility: CLINIC | Age: 55
End: 2025-07-15
Payer: COMMERCIAL

## 2025-07-15 VITALS
DIASTOLIC BLOOD PRESSURE: 84 MMHG | OXYGEN SATURATION: 97 % | BODY MASS INDEX: 34.36 KG/M2 | SYSTOLIC BLOOD PRESSURE: 141 MMHG | RESPIRATION RATE: 16 BRPM | HEIGHT: 69 IN | HEART RATE: 96 BPM | WEIGHT: 232 LBS

## 2025-07-15 PROCEDURE — 20610 DRAIN/INJ JOINT/BURSA W/O US: CPT | Mod: 50

## 2025-07-15 RX ORDER — HYALURONATE SODIUM 20 MG/2 ML
20 SYRINGE (ML) INTRAARTICULAR
Qty: 0 | Refills: 0 | Status: COMPLETED | OUTPATIENT
Start: 2025-07-15

## 2025-07-15 RX ADMIN — Medication 0 MG/2ML: at 00:00

## 2025-08-19 ENCOUNTER — NON-APPOINTMENT (OUTPATIENT)
Age: 55
End: 2025-08-19

## 2025-08-21 ENCOUNTER — RX RENEWAL (OUTPATIENT)
Age: 55
End: 2025-08-21

## 2025-08-22 ENCOUNTER — APPOINTMENT (OUTPATIENT)
Dept: UROGYNECOLOGY | Facility: CLINIC | Age: 55
End: 2025-08-22

## 2025-09-04 ENCOUNTER — RX RENEWAL (OUTPATIENT)
Age: 55
End: 2025-09-04

## 2025-09-15 ENCOUNTER — RX RENEWAL (OUTPATIENT)
Age: 55
End: 2025-09-15